# Patient Record
Sex: FEMALE | Race: WHITE | NOT HISPANIC OR LATINO | Employment: OTHER | URBAN - METROPOLITAN AREA
[De-identification: names, ages, dates, MRNs, and addresses within clinical notes are randomized per-mention and may not be internally consistent; named-entity substitution may affect disease eponyms.]

---

## 2023-07-06 LAB — THYROTROPIN (MIU/L) IN SER/PLAS BY DETECTION LIMIT <= 0.05 MIU/L: 0.14 MIU/L (ref 0.44–3.98)

## 2023-08-24 LAB — THYROTROPIN (MIU/L) IN SER/PLAS BY DETECTION LIMIT <= 0.05 MIU/L: 0.06 MIU/L (ref 0.44–3.98)

## 2023-10-31 ENCOUNTER — LAB (OUTPATIENT)
Dept: LAB | Facility: LAB | Age: 83
End: 2023-10-31
Payer: MEDICARE

## 2023-10-31 DIAGNOSIS — E03.9 HYPOTHYROIDISM, UNSPECIFIED: Primary | ICD-10-CM

## 2023-10-31 LAB — TSH SERPL-ACNC: 0.07 MIU/L (ref 0.44–3.98)

## 2023-10-31 PROCEDURE — 36415 COLL VENOUS BLD VENIPUNCTURE: CPT

## 2023-10-31 PROCEDURE — 84443 ASSAY THYROID STIM HORMONE: CPT

## 2023-11-29 DIAGNOSIS — E05.90 HYPERTHYROIDISM: ICD-10-CM

## 2023-11-29 RX ORDER — LEVOTHYROXINE SODIUM 75 UG/1
75 TABLET ORAL
Qty: 90 TABLET | Refills: 3 | Status: SHIPPED | OUTPATIENT
Start: 2023-11-29

## 2023-11-29 RX ORDER — LEVOTHYROXINE SODIUM 75 UG/1
75 TABLET ORAL
COMMUNITY
End: 2023-11-29 | Stop reason: SDUPTHER

## 2024-01-05 ENCOUNTER — APPOINTMENT (OUTPATIENT)
Dept: ORTHOPEDIC SURGERY | Facility: CLINIC | Age: 84
End: 2024-01-05
Payer: MEDICARE

## 2024-01-16 ENCOUNTER — OFFICE VISIT (OUTPATIENT)
Dept: ORTHOPEDIC SURGERY | Facility: CLINIC | Age: 84
End: 2024-01-16
Payer: MEDICARE

## 2024-01-16 ENCOUNTER — ANCILLARY PROCEDURE (OUTPATIENT)
Dept: RADIOLOGY | Facility: CLINIC | Age: 84
End: 2024-01-16
Payer: MEDICARE

## 2024-01-16 VITALS — WEIGHT: 148 LBS | HEIGHT: 62 IN | BODY MASS INDEX: 27.23 KG/M2

## 2024-01-16 DIAGNOSIS — M54.50 NONSPECIFIC PAIN IN THE LUMBAR REGION: ICD-10-CM

## 2024-01-16 DIAGNOSIS — S39.012A LUMBAR STRAIN, INITIAL ENCOUNTER: ICD-10-CM

## 2024-01-16 DIAGNOSIS — M54.16 LUMBAR RADICULOPATHY: Primary | ICD-10-CM

## 2024-01-16 DIAGNOSIS — M47.816 LUMBAR SPONDYLOSIS: ICD-10-CM

## 2024-01-16 PROCEDURE — 99214 OFFICE O/P EST MOD 30 MIN: CPT | Performed by: ORTHOPAEDIC SURGERY

## 2024-01-16 PROCEDURE — 99214 OFFICE O/P EST MOD 30 MIN: CPT | Mod: 25 | Performed by: ORTHOPAEDIC SURGERY

## 2024-01-16 PROCEDURE — 72110 X-RAY EXAM L-2 SPINE 4/>VWS: CPT | Performed by: STUDENT IN AN ORGANIZED HEALTH CARE EDUCATION/TRAINING PROGRAM

## 2024-01-16 PROCEDURE — 1159F MED LIST DOCD IN RCRD: CPT | Performed by: ORTHOPAEDIC SURGERY

## 2024-01-16 PROCEDURE — 72120 X-RAY BEND ONLY L-S SPINE: CPT

## 2024-01-16 RX ORDER — PREDNISONE 10 MG/1
TABLET ORAL
Qty: 43 TABLET | Refills: 0 | Status: SHIPPED | OUTPATIENT
Start: 2024-01-16 | End: 2024-01-25

## 2024-01-16 NOTE — PROGRESS NOTES
HPI:Rochelle Hamlin is an 83-year-old woman, who comes in with a 3-week history of acute low back pain.  When it started, she went to the [facility].  She was told to do some exercises, and sent home.  She has had some improvement although there is intermittent pain into the left buttock and leg.  She has not had a steroid taper and physical therapy.  She comes in today for second opinion.      ROS:  Reviewed on EMR and patient intake sheet.    PMH/SH:  Reviewed on EMR and patient intake sheet.    Exam:  Physical Exam    Constitutional: Well appearing; no acute distress  Eyes: pupils are equal and round  Psych: normal affect  Respiratory: non-labored breathing  Cardiovascular: regular rate and rhythm  GI: non-distended abdomen  Musculoskeletal: no pain with range of motion of the hips bilaterally  Neurologic: [4]/5 strength in the lower extremities bilaterally]; [negative] straight leg raise; no clonus; negative babinski    Radiology:     X-rays demonstrate multilevel disc degeneration.  Small lumbar curve.  Multilevel spondylosis.    Diagnosis:    Acute lumbar strain; lumbar spondylosis; lumbar radiculopathy    Assessment and Plan:   83-year-old woman, with acute lumbar strain and resulting radicular symptoms.  I recommended prednisone taper and physical therapy.  If the symptoms do not continue to improve, we will see her back as needed.    The patient was in agreement with the plan. At the end of the visit today, the patient felt that all questions had been answered satisfactorily.  The patient was pleased with the visit and very appreciative for the care rendered.     Thank you very much for the kind referral.  It is a privilege, and a pleasure, to partner with you in the care of your patients.  I would be delighted to assist you with any further consultations as needed.          Tyson Begum MD    Chief of Spine Surgery, [facility]  Director of Spine Service,  [facility]  , Department of Orthopaedics  [facility] [employer]  [address]  [place] [id number]  P: [phone]    This note was dictated with voice recognition software.  It has not been proofread for grammatical errors, typographical mistakes or other semantic inconsistencies.

## 2024-01-25 PROBLEM — E78.2 MIXED HYPERLIPIDEMIA: Status: ACTIVE | Noted: 2023-06-22

## 2024-01-25 PROBLEM — K21.9 GERD (GASTROESOPHAGEAL REFLUX DISEASE): Status: ACTIVE | Noted: 2019-05-03

## 2024-01-25 PROBLEM — E06.3 HASHIMOTO'S THYROIDITIS: Status: ACTIVE | Noted: 2023-10-31

## 2024-01-25 PROBLEM — I10 PRIMARY HYPERTENSION: Status: ACTIVE | Noted: 2023-06-22

## 2024-01-25 PROBLEM — E88.810 DYSMETABOLIC SYNDROME X: Status: ACTIVE | Noted: 2024-01-25

## 2024-01-25 RX ORDER — VIT C/E/ZN/COPPR/LUTEIN/ZEAXAN 250MG-90MG
1000 CAPSULE ORAL
COMMUNITY

## 2024-01-25 RX ORDER — EZETIMIBE 10 MG/1
10 TABLET ORAL
COMMUNITY
Start: 2017-04-04

## 2024-01-25 RX ORDER — LISINOPRIL 10 MG/1
TABLET ORAL
COMMUNITY
Start: 2023-07-10

## 2024-01-25 RX ORDER — ESOMEPRAZOLE MAGNESIUM 40 MG/1
1 CAPSULE, DELAYED RELEASE ORAL
COMMUNITY
Start: 2020-06-18

## 2024-01-25 RX ORDER — DULOXETIN HYDROCHLORIDE 20 MG/1
20 CAPSULE, DELAYED RELEASE ORAL
COMMUNITY
Start: 2019-06-04

## 2024-01-25 RX ORDER — CYANOCOBALAMIN (VITAMIN B-12) 2500 MCG
1 TABLET, SUBLINGUAL SUBLINGUAL
COMMUNITY

## 2024-01-25 RX ORDER — GABAPENTIN 100 MG/1
100 CAPSULE ORAL 2 TIMES DAILY
COMMUNITY
Start: 2019-08-01

## 2024-01-25 RX ORDER — AMLODIPINE BESYLATE 5 MG/1
5 TABLET ORAL DAILY
COMMUNITY

## 2024-02-06 ENCOUNTER — TRANSCRIBE ORDERS (OUTPATIENT)
Dept: ORTHOPEDIC SURGERY | Facility: HOSPITAL | Age: 84
End: 2024-02-06
Payer: MEDICARE

## 2024-02-06 DIAGNOSIS — S39.012A LUMBAR STRAIN, INITIAL ENCOUNTER: ICD-10-CM

## 2024-02-06 DIAGNOSIS — M54.16 LUMBAR RADICULOPATHY: ICD-10-CM

## 2024-02-06 DIAGNOSIS — M54.50 NONSPECIFIC PAIN IN THE LUMBAR REGION: ICD-10-CM

## 2024-02-08 ENCOUNTER — LAB (OUTPATIENT)
Dept: LAB | Facility: LAB | Age: 84
End: 2024-02-08
Payer: MEDICARE

## 2024-02-08 DIAGNOSIS — E05.90 HYPERTHYROIDISM: ICD-10-CM

## 2024-02-08 LAB — TSH SERPL-ACNC: 1.68 MIU/L (ref 0.44–3.98)

## 2024-02-08 PROCEDURE — 84443 ASSAY THYROID STIM HORMONE: CPT

## 2024-02-08 PROCEDURE — 36415 COLL VENOUS BLD VENIPUNCTURE: CPT

## 2024-02-15 ENCOUNTER — OFFICE VISIT (OUTPATIENT)
Dept: PAIN MEDICINE | Facility: HOSPITAL | Age: 84
End: 2024-02-15
Payer: MEDICARE

## 2024-02-15 VITALS — DIASTOLIC BLOOD PRESSURE: 83 MMHG | SYSTOLIC BLOOD PRESSURE: 163 MMHG | HEART RATE: 91 BPM

## 2024-02-15 DIAGNOSIS — M54.16 LUMBAR RADICULOPATHY: ICD-10-CM

## 2024-02-15 PROCEDURE — 1159F MED LIST DOCD IN RCRD: CPT | Performed by: ANESTHESIOLOGY

## 2024-02-15 PROCEDURE — 1125F AMNT PAIN NOTED PAIN PRSNT: CPT | Performed by: ANESTHESIOLOGY

## 2024-02-15 PROCEDURE — 3077F SYST BP >= 140 MM HG: CPT | Performed by: ANESTHESIOLOGY

## 2024-02-15 PROCEDURE — 3079F DIAST BP 80-89 MM HG: CPT | Performed by: ANESTHESIOLOGY

## 2024-02-15 PROCEDURE — 99204 OFFICE O/P NEW MOD 45 MIN: CPT | Performed by: ANESTHESIOLOGY

## 2024-02-15 PROCEDURE — 99214 OFFICE O/P EST MOD 30 MIN: CPT | Performed by: ANESTHESIOLOGY

## 2024-02-15 ASSESSMENT — PAIN - FUNCTIONAL ASSESSMENT: PAIN_FUNCTIONAL_ASSESSMENT: 0-10

## 2024-02-15 ASSESSMENT — PAIN SCALES - GENERAL: PAINLEVEL_OUTOF10: 6

## 2024-02-15 NOTE — PROGRESS NOTES
Chief Complaint   Patient presents with    Back Pain     82 y/o female c/o low back pain      HPI   Rochelle Hamlin is an 83-year-old female with a history of back pain.  She also has a history of neck pain and prior surgery with Dr. Begum.  She was referred to the office by Dr. Begum for conservative measures.  The patient does have a history of back pain years but notes that it has been particularly worse recently.  She has seen Dr. Boris Sarmiento in the past and had injections remotely which she says alleviated her right sided low back, buttock pain, sciatic symptoms.  She has similar symptoms at this time which are more so on the left.  The pain can start at the waistline, radiate into the left buttock and then she also has symptoms that can go down the leg anteriorly to the knee.  She denies any symptoms below the knee.  She says that at times her legs feel like she has worked out significantly even if she has not done anything.  Pain can be worse in the morning worse after she has been an active.  She has done prior formal physical therapy and is working out at the gym 2-3 times a week for the past 2 weeks.  She is working on weights, core strengthening, and aerobic exercise.    She has tried gabapentin, duloxetine, anti-inflammatories, Tylenol.    ROS: 13 point review of systems is complete and is negative listed above in HPI    Past Medical History:   Diagnosis Date    Vitamin D deficiency, unspecified 10/14/2014    Vitamin D deficiency     Social History     Socioeconomic History    Marital status:      Spouse name: Not on file    Number of children: Not on file    Years of education: Not on file    Highest education level: Not on file   Occupational History    Not on file   Tobacco Use    Smoking status: Not on file    Smokeless tobacco: Not on file   Substance and Sexual Activity    Alcohol use: Not on file    Drug use: Not on file    Sexual activity: Not on file   Other Topics Concern     Not on file   Social History Narrative    Not on file     Social Determinants of Health     Financial Resource Strain: Not on file   Food Insecurity: Not on file   Transportation Needs: Not on file   Physical Activity: Not on file   Stress: Not on file   Social Connections: Not on file   Intimate Partner Violence: Not on file   Housing Stability: Not on file      Family history: Noncontributory to the aforementioned chief complaint    Surgical history: Prior spine surgery, cervical    Imaging:  Patient had x-rays at last month which showed degenerative changes throughout the lumbar spine with scoliosis.  Worst disc space narrowing at L3-4.    Physical Exam:  Gen.: Patient appears to be stated age, fair hygiene  Eyes: Pupils are symmetric, conjunctiva is nonicteric and lids without obvious drooping or rash  ENT: Hearing is grossly intact, external ears and nose appear to be without deformity or rash. No lesions or masses noted.  Neck: No JVD noted, tracheal position is midline  Respiratory: No gasping or shortness of breath noted, no use of accessory muscles noted  Cardiovascular: Extremity show no edema or varicosities  Lymph: No lymphadenopathy noted in the anterior cervical regions bilaterally  Skin no rashes or open lesions or ulcers identified on the skin  Musculoskeletal: Gait is grossly normal, nontender to palpation over the facets, SI joint, trochanteric bursa.  Neurologic: Cranial nerves II through XII are grossly intact  Psychiatric:  Patient is alert and oriented x3    Impression/Plan:  83-year-old female with a history of back pain and radicular symptoms into the anterior legs at times.  Noted claudication symptoms.  She has tried some prior formal physical therapy, medication management, and had remote injections with Dr. Boris Sarmiento which relieved her symptoms which were previously on the contralateral side.    -Will plan for L3-4 intralaminar.  Procedure, risk, benefits, alternatives reviewed  with the patient.    -Encouraged to keep up with physical therapy and core strengthening.  Patient has another course of formal physical therapy scheduled in March which was the earliest available.  She is going to the gym 2-3 times a week.  I gave her further printed out physician directed exercises to do.    -If no relief with the aforementioned would plan to do an MRI.  Patient says she is extremely claustrophobic and in the past before her surgery she got an open MRI and also needed Valium to get through that.  Will hold off for now.    Patient's blood pressure has been elevated recently and she has been in contact with her primary care physician.  She had some blurred vision and headache which she is not having at this moment but she did take an additional dose of amlodipine.  Will check blood pressure before she leaves today.  She has follow-up with her PCP tomorrow.

## 2024-02-20 ENCOUNTER — APPOINTMENT (OUTPATIENT)
Dept: ORTHOPEDIC SURGERY | Facility: CLINIC | Age: 84
End: 2024-02-20
Payer: MEDICARE

## 2024-02-22 ENCOUNTER — OFFICE VISIT (OUTPATIENT)
Dept: ENDOCRINOLOGY | Facility: CLINIC | Age: 84
End: 2024-02-22
Payer: MEDICARE

## 2024-02-22 VITALS — SYSTOLIC BLOOD PRESSURE: 126 MMHG | DIASTOLIC BLOOD PRESSURE: 74 MMHG | BODY MASS INDEX: 27.98 KG/M2 | WEIGHT: 153 LBS

## 2024-02-22 DIAGNOSIS — E88.810 DYSMETABOLIC SYNDROME X: ICD-10-CM

## 2024-02-22 DIAGNOSIS — E06.3 HASHIMOTO'S THYROIDITIS: Primary | ICD-10-CM

## 2024-02-22 DIAGNOSIS — E78.2 MIXED HYPERLIPIDEMIA: ICD-10-CM

## 2024-02-22 DIAGNOSIS — I10 PRIMARY HYPERTENSION: ICD-10-CM

## 2024-02-22 PROCEDURE — 3078F DIAST BP <80 MM HG: CPT | Performed by: INTERNAL MEDICINE

## 2024-02-22 PROCEDURE — 1159F MED LIST DOCD IN RCRD: CPT | Performed by: INTERNAL MEDICINE

## 2024-02-22 PROCEDURE — 99214 OFFICE O/P EST MOD 30 MIN: CPT | Performed by: INTERNAL MEDICINE

## 2024-02-22 PROCEDURE — 1125F AMNT PAIN NOTED PAIN PRSNT: CPT | Performed by: INTERNAL MEDICINE

## 2024-02-22 PROCEDURE — 3074F SYST BP LT 130 MM HG: CPT | Performed by: INTERNAL MEDICINE

## 2024-02-22 NOTE — PROGRESS NOTES
Patient ID: Rochelle Hamlin is a 83 y.o. female who presents for Follow-up.  HPI  The patient comes in for follow up.    She has hypothyroidism hyperlipidemia insulin resistance and vitamin D deficiency.    She continues on levothyroxine 75 mcg daily.    She has had issues with labile blood pressure and is now on amlodipine.    She notes myalgias in her lower extremities and brittle nails.    She has been intolerant of simvastatin Lescol Lipitor Crestor and Livalo.    Physically she has no other complaints.    ROS  Comprehensive review of systems is negative.    Objective   Physical Exam  Visit Vitals  /74      Vitals:    02/22/24 1253   Weight: 69.4 kg (153 lb)      Body mass index is 27.98 kg/m².      Weight 153 up 4 pounds still down 13    Eyes normal  ENT normal. No adenopathy  Thyroid palpable and normal. No nodules  Chest clear to auscultation  Heart sounds are normal  Abdomen nontender. Bowel sounds normal. No organomegaly  Feet are okay    Current Outpatient Medications   Medication Sig Dispense Refill    DULoxetine (Cymbalta) 20 mg DR capsule Take 1 capsule (20 mg) by mouth once daily.      esomeprazole (NexIUM) 40 mg DR capsule Take 1 capsule (40 mg) by mouth.      ezetimibe (Zetia) 10 mg tablet Take 1 tablet (10 mg) by mouth once daily.      gabapentin (Neurontin) 100 mg capsule Take 1 capsule (100 mg) by mouth twice a day.      lisinopril 10 mg tablet Take by mouth.      amLODIPine (Norvasc) 5 mg tablet Take 1 tablet (5 mg) by mouth once daily.      biotin 5,000 mcg tablet, sublingual Place 1 tablet under the tongue once daily.      cholecalciferol (Vitamin D-3) 25 MCG (1000 UT) capsule Take 1 capsule (25 mcg) by mouth.      levothyroxine (Synthroid, Levoxyl) 75 mcg tablet Take 1 tablet (75 mcg) by mouth once daily in the morning. Take before meals. 90 tablet 3     No current facility-administered medications for this visit.       Assessment/Plan     1.  Hypothyroidism  2.  Insulin  resistance  3.  Hyperlipidemia    We reviewed her blood work from 2 weeks ago.    We discussed her thyroid levels being appropriate.    She will continue her current regimen.    We again discussed the nonspecificity with regards to thyroid symptoms.    Advise she follow-up with her primary care doctor regarding the lability of her blood pressure.    She will follow-up with me in 6 months sooner as needed.

## 2024-03-04 ENCOUNTER — APPOINTMENT (OUTPATIENT)
Dept: RADIOLOGY | Facility: HOSPITAL | Age: 84
End: 2024-03-04
Payer: MEDICARE

## 2024-03-04 NOTE — PROGRESS NOTES
Physical Therapy  Physical Therapy Orthopedic Evaluation    Patient Name: Rochelle Hamlin  MRN: [id number]  Today's Date: 3/5/2024  Time Calculation  Start Time: 0945  Stop Time: 1025  Time Calculation (min): 40 min    PT Evaluation Time Entry  PT Evaluation (Low) Time Entry: 15  PT Therapeutic Procedures Time Entry  Manual Therapy Time Entry: 13  Therapeutic Exercise Time Entry: 10       Insurance:  Visit number: 1 of [place]  Authorization info: No auth  Insurance Type: Payor: [employer] MEDICARE / Plan: UNITED [employer] MEDICARE / Product Type: *No Product type* /      Current Problem  1. Lumbar radiculopathy  Referral to Physical Therapy    Follow Up In Physical Therapy          Surgery:No    Referred by: Gus Ren Provider     Precautions:   Neck Fusion-3 level, Lobectomy 2023  Medical History Form: Reviewed (scanned into chart)    Subjective:   Subjective   Chief Complaint: Low back pain  Onset: 2 months ago  STEPHANIE: Acute on chronic flare up    General: 2 month history of acute low back pain. When it started, she went to the [facility]. She was told to do some exercises, and sent home. She has had some improvement although there is intermittent pain into the left buttock and leg. She has not had a steroid taper and physical therapy. Still having L side radic from her back to the knee on the outside. Sleeping with a pillow between her legs     Current Condition:   Better    Pain:     Location: L side radic> back pain  Rating: Best-1, Current-3, Worst-7  Description: L Radic is achy  Aggravating Factors:  sleeping   Relieving Factors:  tylenol, and PT stretches from the past    Relevant Information (PMH & Previous Tests/Imaging): X-rays demonstrate multilevel disc degeneration. Small lumbar curve. Multilevel spondylosis.     Previous Interventions/Treatments: None    Prior Level of Function (PLOF)  Patient previously independent with all ADLs  Exercise/Physical Activity: Golf 3 x per  "week  Work/School: Retired, she does report she has been working part-time at a [employer] facility on Saturdays    Patients Living Environment: Reviewed and no concern  Primary Language: English  Patient's Goal(s) for Therapy: Pain relief     Red Flags: Do you have any of the following? No  Fever/chills, unexplained weight changes, dizziness/fainting, unexplained change in bowel or bladder functions, unexplained malaise or muscle weakness, night pain/sweats, numbness or tingling    Objective:  Objective     Palpation/Observation  TTP L L4/L5  Posture  No accentuated curvatures, Neutral LE alignment   No leg length, pelvic landmarks are symmetrical   Special Testing  + SLR, + sacral rock  ROM  3/5/2024  Trunk  Flexion within normal limits  Extension 50% limited  Right left rotation mary alice 25% limited with mild pain  Hip flexion bilaterally within normal limits, hip external rotation bilaterally within normal limits, internal rotation limited by 10 to 15 degrees  Hamstring flexibility 45 degrees popliteal angle bilaterally  Central PA glides of the lumbar spine do show mild restriction at L4 and L5.  Strength  3/5/2024  4/5 hip flexion on the L, all other areas WNL  Sensation  L4 and L5 L paraesthesias to the lateral knee  Reflexes  2+ seated achilles and patellar    Transfers: Not using mary upper extremities for sit to stand, does requires Min A for supine to sit  Gait: WNL not using assistive device   SL Balance: NT  DL Squat: sit to stand using mary upper extremity  SL Squat: NT     Outcome Measures:  Other Measures  Oswestry Disablity Index (WILLIE): 16%   3/5/2024  Treatments:  Ther-EX:  SKTC 2 x 30\"  Hamstring stretch 2 x 30\"  Pelvic tilt x 10  Bridging x 10    There- ACT:  NP  Neuro:  NP  Manual:  L STM in the R sidelying position lumbar paraspinals L4-s1  Modalities:  NP    PT Evaluation Time Entry  PT Evaluation (Low) Time Entry: 15  PT Therapeutic Procedures Time Entry  Manual Therapy Time Entry: 13  Therapeutic " Exercise Time Entry: 10       EDUCATION: Home exercise program, plan of care, activity modifications, pain management, and injury pathology         Assessment: Patient presents with signs and symptoms consistent with lumbar DDD, resulting in limited participation in pain-free ADLs and inability to perform at their prior level of function. Pt would benefit from physical therapy to address the impairments found & listed previously in the objective section in order to return to safe and pain-free ADLs and prior level of function.     Complexity:Low  Prognosis: Good  Response to care: Good    Problem List:  Pain  Function  Mobility  Strength  Plan:     Planned Interventions include: therapeutic exercise, self-care home management, manual therapy, therapeutic activities, gait training, neuromuscular coordination, vasopneumatic, dry needling, aquatic therapy    Next Treatment:Bike, hip flexor stretch, supine march  Frequency: 1 x Week  Duration: 6 Weeks  Goals: Set and discussed today  Active       PT Problem       PT Goal 1       Start:  03/05/24    Expected End:  04/16/24       1.Patient to be independent with HEP for progressions and carryover    2. Patient to report pain less than 3/10 for return to daily activities with less radicular pain    3. Patient to have improved ability to transfer with out assistance to assist with returning to daily activities independently    4. Improved Oswestry disability outcome measure for improved pain and function with daily activities    5.  Patient able to return to golf without limitations from low back                Plan of care was developed with input and agreement by the patient      Lg Ying, PT

## 2024-03-05 ENCOUNTER — EVALUATION (OUTPATIENT)
Dept: PHYSICAL THERAPY | Facility: CLINIC | Age: 84
End: 2024-03-05
Payer: MEDICARE

## 2024-03-05 DIAGNOSIS — M54.16 LUMBAR RADICULOPATHY: ICD-10-CM

## 2024-03-05 PROCEDURE — 97110 THERAPEUTIC EXERCISES: CPT | Mod: GP | Performed by: PHYSICAL THERAPIST

## 2024-03-05 PROCEDURE — 97140 MANUAL THERAPY 1/> REGIONS: CPT | Mod: GP | Performed by: PHYSICAL THERAPIST

## 2024-03-05 PROCEDURE — 97161 PT EVAL LOW COMPLEX 20 MIN: CPT | Mod: GP | Performed by: PHYSICAL THERAPIST

## 2024-03-05 ASSESSMENT — ENCOUNTER SYMPTOMS
OCCASIONAL FEELINGS OF UNSTEADINESS: 0
DEPRESSION: 0
LOSS OF SENSATION IN FEET: 0

## 2024-03-11 ENCOUNTER — HOSPITAL ENCOUNTER (OUTPATIENT)
Dept: RADIOLOGY | Facility: HOSPITAL | Age: 84
Discharge: HOME | End: 2024-03-11
Payer: MEDICARE

## 2024-03-11 VITALS
OXYGEN SATURATION: 98 % | DIASTOLIC BLOOD PRESSURE: 54 MMHG | HEART RATE: 73 BPM | SYSTOLIC BLOOD PRESSURE: 121 MMHG | RESPIRATION RATE: 16 BRPM | TEMPERATURE: 97.7 F

## 2024-03-11 DIAGNOSIS — M54.16 LUMBAR RADICULOPATHY: ICD-10-CM

## 2024-03-11 PROCEDURE — 62323 NJX INTERLAMINAR LMBR/SAC: CPT | Performed by: ANESTHESIOLOGY

## 2024-03-11 ASSESSMENT — PAIN SCALES - GENERAL
PAINLEVEL_OUTOF10: 3
PAINLEVEL_OUTOF10: 0 - NO PAIN

## 2024-03-11 ASSESSMENT — PAIN - FUNCTIONAL ASSESSMENT
PAIN_FUNCTIONAL_ASSESSMENT: 0-10
PAIN_FUNCTIONAL_ASSESSMENT: 0-10

## 2024-03-11 NOTE — PROCEDURES
Preoperative diagnosis:  Lumbar radiculitis  Postoperative diagnosis:  Lumbar radiculitis, stenosis  Procedure: L3-4 lumbar epidural steroid injection under fluoroscopic guidance  Surgeon: Brandie Rucker  Assistant:  Fellow, Gabe Kenyon  Anesthesia: Local  Complications: Apparently none    Clinical note: Ms. Rochelle Hamlin is an 83-year-old female with history of back and symptoms more to the left side.  She presents today for an epidural injection.    Procedure note: The patient was met in the preoperative holding area after risks benefits and alternatives to procedure were discussed with the patient, informed consent was obtained. Patient brought back to the procedure room and placed in the prone position on the fluoroscopy table. Area over the back was exposed, prepped, draped, in the usual sterile fashion.  Skin and subcutaneous tissues to the lumbar intralaminar space was anesthetized using 0.5% lidocaine, entry from a left paraspinous approach.  An 18-gauge Tuohy needle was inserted in the skin and advanced into the interlaminar space however there is a good amount of bony os specially on the left of midline. A glass syringe was used to achieve the epidural space using the loss resistance technique. Contrast was injected which showed appropriate epidural spread, no intravascular or intrathecal uptake.  Contrast confirmed in AP and lateral views.  A total of 3 mL's of 0.5% lidocaine mixed with 40 mg methylprednisolone was injected. Needle removed, bandage applied, patient tolerated the procedure well with no immediate complications.

## 2024-03-11 NOTE — H&P
History Of Present Illness  Rochelle Hamlin is a 83 y.o. female presents for procedure state below. Endorses no changes in past medical history or medical health since last seen in clinic.     Past Medical History  She has a past medical history of Vitamin D deficiency, unspecified (10/14/2014).    Surgical History  She has no past surgical history on file.     Social History  She has no history on file for tobacco use, alcohol use, and drug use.    Family History  No family history on file.     Allergies  Codeine and Penicillins    Review of Symptoms:   Constitutional: Negative for chills, diaphoresis or fever  HENT: Negative for neck swelling  Eyes:.  Negative for eye pain  Respiratory:.  Negative for cough, shortness of breath or wheezing    Cardiovascular:.  Negative for chest pain or palpitations  Gastrointestinal:.  Negative for abdominal pain, nausea and vomiting  Genitourinary:.  Negative for urgency  Musculoskeletal:  Positive for back pain. Positive for joint pain. Denies falls within the past 3 months.  Skin: Negative for wounds or itching   Neurological: Negative for dizziness, seizures, loss of consciousness and weakness  Endo/Heme/Allergies: Does not bruise/bleed easily  Psychiatric/Behavioral: Negative for depression. The patient does not appear anxious.       PHYSICAL EXAM  Vitals signs reviewed  Constitutional:       General: Not in acute distress     Appearance: Normal appearance. Not ill-appearing.  HENT:     Head: Normocephalic and atraumatic  Eyes:     Conjunctiva/sclera: Conjunctivae normal  Cardiovascular:     Rate and Rhythm: Normal rate and regular rhythm  Pulmonary:     Effort: No respiratory distress  Abdominal:     Palpations: Abdomen is soft  Musculoskeletal: WADE  Skin:     General: Skin is warm and dry  Neurological:     General: No focal deficit present  Psychiatric:         Mood and Affect: Mood normal         Behavior: Behavior normal     Last Recorded Vitals  /66   Pulse  81   Temp 36.6 °C (97.8 °F) (Temporal)   Resp 16   SpO2 96%     Relevant Results  Current Outpatient Medications   Medication Instructions    amLODIPine (NORVASC) 5 mg, oral, Daily    biotin 5,000 mcg tablet, sublingual 1 tablet, sublingual, Daily RT    cholecalciferol (VITAMIN D-3) 1,000 Units, oral    DULoxetine (CYMBALTA) 20 mg, oral, Daily RT    esomeprazole (NexIUM) 40 mg DR capsule 1 capsule, oral    ezetimibe (ZETIA) 10 mg, oral, Daily RT    gabapentin (NEURONTIN) 100 mg, oral, 2 times daily    levothyroxine (SYNTHROID, LEVOXYL) 75 mcg, oral, Daily before breakfast    lisinopril 10 mg tablet oral       No results found for this or any previous visit from the past 1000 days.     No image results found.       1. Lumbar radiculopathy  FL pain management TC    FL pain management TC    Epidural Steroid Injection    Epidural Steroid Injection           ASSESSMENT/PLAN  Rochelle Hamlin is a 83 y.o. female presents for L3-4 intralaminar     Our plan is as follows:  - Follow In pain clinic  - Continue to participate in physical therapy as well as physician directed home exercises  - Continue pain medications as prescribed       Gabe Kenyon MD

## 2024-03-21 ENCOUNTER — TREATMENT (OUTPATIENT)
Dept: PHYSICAL THERAPY | Facility: CLINIC | Age: 84
End: 2024-03-21
Payer: MEDICARE

## 2024-03-21 DIAGNOSIS — M54.16 LUMBAR RADICULOPATHY: ICD-10-CM

## 2024-03-21 PROCEDURE — 97140 MANUAL THERAPY 1/> REGIONS: CPT | Mod: GP | Performed by: PHYSICAL THERAPIST

## 2024-03-21 NOTE — PROGRESS NOTES
Physical Therapy  Physical Therapy Treatment Note    Patient Name: Rochelle Hamlin  MRN: [id number]  Today's Date: 3/21/2024  Time Calculation  Start Time: 0903  Stop Time: 0941  Time Calculation (min): 38 min       PT Therapeutic Procedures Time Entry  Manual Therapy Time Entry: 38     Referring:Gus Ren Provider     Insurance:  Visit number: 2 of [place]    Authorization info: No auth  Insurance Type: Payor: [facility] MEDICARE / Plan: [employer] MEDICARE / Product Type: *No Product type* /     Current Problem  1. Lumbar radiculopathy  Follow Up In Physical Therapy          General   2 month history of acute low back pain. When it started, she went to the [facility]. She was told to do some exercises, and sent home. She has had some improvement although there is intermittent pain into the left buttock and leg. She has not had a steroid taper and physical therapy. Still having L side radic from her back to the knee on the outside. Sleeping with a pillow between her legs     Precautions   3/11 L3-4 lumbar epidural steroid injection under fluoroscopic guidance , : X-rays demonstrate multilevel disc degeneration. Small lumbar curve. Multilevel spondylosis.     Subjective:   Subjective   Patient reports Feels better after having [facility] last week  Pain    L side Radic to the hip, mild L>R LBP  Objective:   Objective   ROM  3/5/2024  Trunk  Flexion within normal limits  Extension 50% limited  Right left rotation mary alice 25% limited with mild pain  Hip flexion bilaterally within normal limits, hip external rotation bilaterally within normal limits, internal rotation limited by 10 to 15 degrees  Hamstring flexibility 45 degrees popliteal angle bilaterally  Central PA glides of the lumbar spine do show mild restriction at L4 and L5.  Strength  3/5/2024  4/5 hip flexion on the L, all other areas WNL    Treatments:   Ther Ex  Reviewed HEP  Initiated scap retractions for thoracic  tightness  Manual    STM, L sided lumbar parspinals, Long axis distraction mary, L>R       PT Therapeutic Procedures Time Entry  Manual Therapy Time Entry: 38     HEP Access Code:  Assessment:  Responded well to manual therapy without increased pain, She is active at home and consistent with HEP, about 50% improvement since having epidural     Plan: Continue plan of care, initiate clamshells next f/u     Goals:  Active       PT Problem       PT Goal 1       Start:  03/05/24    Expected End:  04/16/24       1.Patient to be independent with HEP for progressions and carryover    2. Patient to report pain less than 3/10 for return to daily activities with less radicular pain    3. Patient to have improved ability to transfer with out assistance to assist with returning to daily activities independently    4. Improved Oswestry disability outcome measure for improved pain and function with daily activities    5.  Patient able to return to golf without limitations from low back                 Lg Ying, PT

## 2024-04-01 ENCOUNTER — TREATMENT (OUTPATIENT)
Dept: PHYSICAL THERAPY | Facility: CLINIC | Age: 84
End: 2024-04-01
Payer: MEDICARE

## 2024-04-01 DIAGNOSIS — M54.16 LUMBAR RADICULOPATHY: Primary | ICD-10-CM

## 2024-04-01 PROCEDURE — 97140 MANUAL THERAPY 1/> REGIONS: CPT | Mod: GP | Performed by: PHYSICAL THERAPIST

## 2024-04-01 PROCEDURE — 97110 THERAPEUTIC EXERCISES: CPT | Mod: GP | Performed by: PHYSICAL THERAPIST

## 2024-04-01 NOTE — PROGRESS NOTES
Physical Therapy  Physical Therapy Treatment Note    Patient Name: Rochelle Hamlin  MRN: [id number]  Today's Date: 4/1/2024  Time Calculation  Start Time: 0345  Stop Time: 0430  Time Calculation (min): 45 min       PT Therapeutic Procedures Time Entry  Manual Therapy Time Entry: 25  Therapeutic Exercise Time Entry: 15     Referring:Gus Ren Provider     Insurance:  Visit [place]: [place] of [place]    Authorization info: No auth  Insurance Type: Payor: [facility] MEDICARE / Plan: [employer] MEDICARE / Product Type: *No Product type* /     Current Problem  1. Lumbar radiculopathy              General   2 month history of acute low back pain. When it started, she went to the [facility]. She was told to do some exercises, and sent home. She has had some improvement although there is intermittent pain into the left buttock and leg. She has not had a steroid taper and physical therapy. Still having L side radic from her back to the knee on the outside. Sleeping with a pillow between her legs     Precautions   3/11 L3-4 lumbar epidural steroid injection under fluoroscopic guidance , : X-rays demonstrate multilevel disc degeneration. Small lumbar curve. Multilevel spondylosis.     Subjective:   Subjective   Overall feels better, not in as much pain or discomfort, she is pleased with progress feels about 50% better  Pain    L side Radic to the hip, mild L>R LBP  Objective:   Objective   ROM  3/5/2024  Trunk  Flexion within normal limits  Extension 50% limited  Right left rotation mary alice 25% limited with mild pain  Hip flexion bilaterally within normal limits, hip external rotation bilaterally within normal limits, internal rotation limited by 10 to 15 degrees  Hamstring flexibility 45 degrees popliteal angle bilaterally  Central PA glides of the lumbar spine do show mild restriction at L4 and L5.  Strength  3/5/2024  4/5 hip flexion on the L, all other areas WNL    Treatments:   Ther Ex  Reviewed  HEP  Initiated scap retractions for thoracic tightness  Bike x 5  Trunk rotations x 20  Swiss ball roll x20  Manual    STM, L sided lumbar parspinals, Long axis distraction mary, L>R       PT Therapeutic Procedures Time Entry  Manual Therapy Time Entry: 25  Therapeutic Exercise Time Entry: 15     HEP Access Code:  Assessment:  Tolerated treatment without pain or discomfort, her pain is better, her function is better as she is able to swing golf clubs and take care of her  with less pain     Plan: Continue plan of care, initiate clamshells next f/u     Goals:  Active       PT Problem       PT Goal 1       Start:  03/05/24    Expected End:  04/16/24       1.Patient to be independent with HEP for progressions and carryover    2. Patient to report pain less than 3/10 for return to daily activities with less radicular pain    3. Patient to have improved ability to transfer with out assistance to assist with returning to daily activities independently    4. Improved Oswestry disability outcome measure for improved pain and function with daily activities    5.  Patient able to return to golf without limitations from low back                 Lg Ying, PT

## 2024-04-05 ENCOUNTER — LAB REQUISITION (OUTPATIENT)
Dept: LAB | Facility: HOSPITAL | Age: 84
End: 2024-04-05
Payer: MEDICARE

## 2024-04-05 DIAGNOSIS — N89.9 NONINFLAMMATORY DISORDER OF VAGINA, UNSPECIFIED: ICD-10-CM

## 2024-04-05 PROCEDURE — 87086 URINE CULTURE/COLONY COUNT: CPT

## 2024-04-07 LAB — BACTERIA UR CULT: NORMAL

## 2024-04-08 ENCOUNTER — TREATMENT (OUTPATIENT)
Dept: PHYSICAL THERAPY | Facility: CLINIC | Age: 84
End: 2024-04-08
Payer: MEDICARE

## 2024-04-08 DIAGNOSIS — M54.16 LUMBAR RADICULOPATHY: ICD-10-CM

## 2024-04-08 PROCEDURE — 97110 THERAPEUTIC EXERCISES: CPT | Mod: GP | Performed by: PHYSICAL THERAPIST

## 2024-04-08 PROCEDURE — 97140 MANUAL THERAPY 1/> REGIONS: CPT | Mod: GP | Performed by: PHYSICAL THERAPIST

## 2024-04-08 NOTE — PROGRESS NOTES
Physical Therapy  Physical Therapy Treatment Note    Patient Name: Rochelle Hamlin  MRN: [id number]  Today's Date: 4/8/2024  Time Calculation  Start Time: 0930  Stop Time: 1015  Time Calculation (min): 45 min       PT Therapeutic Procedures Time Entry  Manual Therapy Time Entry: 30  Therapeutic Exercise Time Entry: 15     Referring:Gus Ren Provider     Insurance:  Visit number: 4 of [place]    Authorization info: No auth  Insurance Type: Payor: [facility] MEDICARE / Plan: [employer] MEDICARE / Product Type: *No Product type* /     Current Problem  1. Lumbar radiculopathy  Follow Up In Physical Therapy            General   2 month history of acute low back pain. When it started, she went to the [facility]. She was told to do some exercises, and sent home. She has had some improvement although there is intermittent pain into the left buttock and leg. She has not had a steroid taper and physical therapy. Still having L side radic from her back to the knee on the outside. Sleeping with a pillow between her legs     Precautions   3/11 L3-4 lumbar epidural steroid injection under fluoroscopic guidance , : X-rays demonstrate multilevel disc degeneration. Small lumbar curve. Multilevel spondylosis.     Subjective:   Subjective   Overall feels better, not in any pain this morning  Pain    L side Radic to the hip, mild L>R LBP  Objective:   Objective   ROM  3/5/2024  Trunk  Flexion within normal limits  Extension 50% limited  Right left rotation mary alice 25% limited with mild pain  Hip flexion bilaterally within normal limits, hip external rotation bilaterally within normal limits, internal rotation limited by 10 to 15 degrees  Hamstring flexibility 45 degrees popliteal angle bilaterally  Central PA glides of the lumbar spine do show mild restriction at L4 and L5.  Strength  3/5/2024  4/5 hip flexion on the L, all other areas WNL    Treatments:   Ther Ex  Reviewed HEP  Bike x 5  Swiss ball roll  x20  Trunk rotations x 20  PPT x 15  Manual    STM, L sided lumbar parspinals, Long axis distraction mary, L>R       PT Therapeutic Procedures Time Entry  Manual Therapy Time Entry: 30  Therapeutic Exercise Time Entry: 15     HEP Access Code:  Assessment:  Less low back pain and L radic. Minor LBP from Lumbar DDD     Plan: Continue plan of care,     Goals:  Active       PT Problem       PT Goal 1 (Progressing)       Start:  03/05/24    Expected End:  04/16/24       1.Patient to be independent with HEP for progressions and carryover    2. Patient to report pain less than 3/10 for return to daily activities with less radicular pain    3. Patient to have improved ability to transfer with out assistance to assist with returning to daily activities independently    4. Improved Oswestry disability outcome measure for improved pain and function with daily activities    5.  Patient able to return to golf without limitations from low back                 Lg Ying, PT

## 2024-04-15 ENCOUNTER — TREATMENT (OUTPATIENT)
Dept: PHYSICAL THERAPY | Facility: CLINIC | Age: 84
End: 2024-04-15
Payer: MEDICARE

## 2024-04-15 DIAGNOSIS — M54.16 LUMBAR RADICULOPATHY: ICD-10-CM

## 2024-04-15 PROCEDURE — 97140 MANUAL THERAPY 1/> REGIONS: CPT | Mod: GP | Performed by: PHYSICAL THERAPIST

## 2024-04-15 NOTE — PROGRESS NOTES
Physical Therapy  Physical Therapy Treatment Note    Patient Name: Rochelle Hamlin  MRN: [id number]  Today's Date: 4/15/2024  Time Calculation  Start Time: 1113  Stop Time: 1143  Time Calculation (min): 30 min       PT Therapeutic Procedures Time Entry  Manual Therapy Time Entry: 25     Referring:Gus Ren Provider     Insurance:  Visit [place]: 5 of [place]    Authorization info: No auth  Insurance Type: Payor: [facility] MEDICARE / Plan: [employer] MEDICARE / Product Type: *No Product type* /     Current Problem  1. Lumbar radiculopathy  Follow Up In Physical Therapy            General   2 month history of acute low back pain. When it started, she went to the [facility]. She was told to do some exercises, and sent home. She has had some improvement although there is intermittent pain into the left buttock and leg. She has not had a steroid taper and physical therapy. Still having L side radic from her back to the knee on the outside. Sleeping with a pillow between her legs     Precautions   3/11 L3-4 lumbar epidural steroid injection under fluoroscopic guidance , : X-rays demonstrate multilevel disc degeneration. Small lumbar curve. Multilevel spondylosis.     Subjective:   Subjective   Back is in a good place, she is feeling better   Pain    L side Radic to the hip, mild L>R LBP  Objective:   Objective   ROM  3/5/2024  Trunk  Flexion within normal limits  Extension 50% limited  Right left rotation mary alice 25% limited with mild pain  Hip flexion bilaterally within normal limits, hip external rotation bilaterally within normal limits, internal rotation limited by 10 to 15 degrees  Hamstring flexibility 45 degrees popliteal angle bilaterally  Central PA glides of the lumbar spine do show mild restriction at L4 and L5.  Strength  3/5/2024  4/5 hip flexion on the L, all other areas WNL    Treatments:   Ther Ex  Reviewed HEP  Bike x 5    Manual    STM, L sided lumbar parspinals, Long axis  distraction mary, L>R  Time restricted by 15minutes secondary to patient having to leave early     PT Therapeutic Procedures Time Entry  Manual Therapy Time Entry: 25     HEP Access Code:  Assessment:  Less low back pain and L radic. Minor LBP from Lumbar DDD     Plan: Continue plan of care, she is going to try golfing     Goals:  Active       PT Problem       PT Goal 1 (Progressing)       Start:  03/05/24    Expected End:  04/16/24       1.Patient to be independent with HEP for progressions and carryover    2. Patient to report pain less than 3/10 for return to daily activities with less radicular pain    3. Patient to have improved ability to transfer with out assistance to assist with returning to daily activities independently    4. Improved Oswestry disability outcome measure for improved pain and function with daily activities    5.  Patient able to return to golf without limitations from low back                 Lg Ying, PT

## 2024-04-22 ENCOUNTER — APPOINTMENT (OUTPATIENT)
Dept: PHYSICAL THERAPY | Facility: CLINIC | Age: 84
End: 2024-04-22
Payer: MEDICARE

## 2024-04-29 ENCOUNTER — TREATMENT (OUTPATIENT)
Dept: PHYSICAL THERAPY | Facility: CLINIC | Age: 84
End: 2024-04-29
Payer: MEDICARE

## 2024-04-29 DIAGNOSIS — M54.16 LUMBAR RADICULOPATHY: ICD-10-CM

## 2024-04-29 PROCEDURE — 97110 THERAPEUTIC EXERCISES: CPT | Mod: GP | Performed by: PHYSICAL THERAPIST

## 2024-04-29 PROCEDURE — 97140 MANUAL THERAPY 1/> REGIONS: CPT | Mod: GP | Performed by: PHYSICAL THERAPIST

## 2024-04-29 NOTE — PROGRESS NOTES
Physical Therapy  Physical Therapy Treatment Note    Patient Name: Rochelle Hamlin  MRN: [id number]  Today's Date: 4/29/2024  Time Calculation  Start Time: 0855             Referring:Tyson Begum MD    Insurance:  Visit number: 6of [place]    Authorization info: No auth  Insurance Type: Payor: [employer] MEDICARE / Plan: UNITED HE[employer] MEDICARE / Product Type: *No Product type* /     Current Problem  1. Lumbar radiculopathy  Follow Up In Physical Therapy              General   2 month history of acute low back pain. When it started, she went to the [facility]. She was told to do some exercises, and sent home. She has had some improvement although there is intermittent pain into the left buttock and leg. She has not had a steroid taper and physical therapy. Still having L side radic from her back to the knee on the outside. Sleeping with a pillow between her legs     Precautions   3/11 L3-4 lumbar epidural steroid injection under fluoroscopic guidance , : X-rays demonstrate multilevel disc degeneration. Small lumbar curve. Multilevel spondylosis.     Subjective:   Subjective   Back is in a good place, she is feeling better   Pain    L side Radic to the hip, mild L>R LBP  Objective:   Objective   ROM  3/5/2024  Trunk  Flexion within normal limits  Extension 50% limited  Right left rotation mary alice 25% limited with mild pain  Hip flexion bilaterally within normal limits, hip external rotation bilaterally within normal limits, internal rotation limited by 10 to 15 degrees  Hamstring flexibility 45 degrees popliteal angle bilaterally  Central PA glides of the lumbar spine do show mild restriction at L4 and L5.  Strength  3/5/2024  4/5 hip flexion on the L, all other areas WNL    Treatments:   Ther Ex  Reviewed HEP  Bike x 5  Swiss ball roll x 15  Clamshells GTB x 20      Manual    STM, L sided lumbar parspinals, Long axis distraction mary, L>R           HEP Access Code:  Assessment:  Less low back pain and  L radic. Minor LBP from Lumbar DDD. L Radic continues to be problematic and is fairly constant over the last few days/week     Plan: Continue plan of care, she is going to contact physician about MRI     Goals:  Active       PT Problem       PT Goal 1 (Progressing)       Start:  03/05/24    Expected End:  05/13/24       1.Patient to be independent with HEP for progressions and carryover    2. Patient to report pain less than 3/10 for return to daily activities with less radicular pain    3. Patient to have improved ability to transfer with out assistance to assist with returning to daily activities independently    4. Improved Oswestry disability outcome measure for improved pain and function with daily activities    5.  Patient able to return to golf without limitations from low back                 Lg Ying, PT

## 2024-05-06 ENCOUNTER — TREATMENT (OUTPATIENT)
Dept: PHYSICAL THERAPY | Facility: CLINIC | Age: 84
End: 2024-05-06
Payer: MEDICARE

## 2024-05-06 DIAGNOSIS — M54.16 LUMBAR RADICULOPATHY: Primary | ICD-10-CM

## 2024-05-06 PROCEDURE — 97140 MANUAL THERAPY 1/> REGIONS: CPT | Mod: GP | Performed by: PHYSICAL THERAPIST

## 2024-05-06 PROCEDURE — 97110 THERAPEUTIC EXERCISES: CPT | Mod: GP | Performed by: PHYSICAL THERAPIST

## 2024-05-06 NOTE — PROGRESS NOTES
Physical Therapy  Physical Therapy Treatment Note    Patient Name: Rochelle Hamlin  MRN: [id number]  Today's Date: 5/6/2024  Time Calculation  Start Time: 1600  Stop Time: 1645  Time Calculation (min): 45 min       PT Therapeutic Procedures Time Entry  Manual Therapy Time Entry: 30  Therapeutic Exercise Time Entry: 10     Referring:Tyson Begum MD    Insurance:  Visit number: 7 of [place]    Authorization info: No auth  Insurance Type: Payor: [employer] MEDICARE / Plan: [employer] MEDICARE / Product Type: *No Product type* /     Current Problem  1. Lumbar radiculopathy  Follow Up In Physical Therapy            General   2 month history of acute low back pain. When it started, she went to the [facility]. She was told to do some exercises, and sent home. She has had some improvement although there is intermittent pain into the left buttock and leg. She has not had a steroid taper and physical therapy. Still having L side radic from her back to the knee on the outside. Sleeping with a pillow between her legs     Precautions   3/11 L3-4 lumbar epidural steroid injection under fluoroscopic guidance , : X-rays demonstrate multilevel disc degeneration. Small lumbar curve. Multilevel spondylosis.     Subjective:   Subjective   Fatigued from golf today, overall pleased with progress, did reach out to pain mangment for follow up   Pain    L side Radic to the hip, mild L>R LBP  Objective:   Objective   ROM  3/5/2024  Trunk  Flexion within normal limits  Extension 50% limited  Right left rotation mary alice 25% limited with mild pain  Hip flexion bilaterally within normal limits, hip external rotation bilaterally within normal limits, internal rotation limited by 10 to 15 degrees  Hamstring flexibility 45 degrees popliteal angle bilaterally  Central PA glides of the lumbar spine do show mild restriction at L4 and L5.  Strength  3/5/2024  4/5 hip flexion on the L, all other areas WNL    Treatments:   Ther  "Ex  Reviewed HEP  Bike x 5  Swiss ball roll x 15  Trunk rotations x 20  SKTC 1 x 30\"  Supine march x 20      Manual    STM, L sided lumbar parspinals, Long axis distraction mary, L>R     PT Therapeutic Procedures Time Entry  Manual Therapy Time Entry: 30  Therapeutic Exercise Time Entry: 10     HEP Access Code:  Assessment:  Low back pain is good overall, L leg is a little flared up since playing golf     Plan: Continue plan of care, she is going to contact physician about MRI     Goals:  Active       PT Problem       PT Goal 1 (Progressing)       Start:  03/05/24    Expected End:  05/13/24       1.Patient to be independent with HEP for progressions and carryover    2. Patient to report pain less than 3/10 for return to daily activities with less radicular pain    3. Patient to have improved ability to transfer with out assistance to assist with returning to daily activities independently    4. Improved Oswestry disability outcome measure for improved pain and function with daily activities    5.  Patient able to return to golf without limitations from low back                 Lg Ying, PT  "

## 2024-05-09 ENCOUNTER — OFFICE VISIT (OUTPATIENT)
Dept: OTOLARYNGOLOGY | Facility: CLINIC | Age: 84
End: 2024-05-09
Payer: MEDICARE

## 2024-05-09 VITALS — HEIGHT: 62 IN | TEMPERATURE: 97 F | WEIGHT: 151.3 LBS | BODY MASS INDEX: 27.84 KG/M2

## 2024-05-09 DIAGNOSIS — H61.21 IMPACTED CERUMEN OF RIGHT EAR: ICD-10-CM

## 2024-05-09 DIAGNOSIS — H69.93 DYSFUNCTION OF BOTH EUSTACHIAN TUBES: ICD-10-CM

## 2024-05-09 DIAGNOSIS — H93.8X2 SENSATION OF FULLNESS IN LEFT EAR: Primary | ICD-10-CM

## 2024-05-09 PROCEDURE — 1160F RVW MEDS BY RX/DR IN RCRD: CPT | Performed by: NURSE PRACTITIONER

## 2024-05-09 PROCEDURE — 1159F MED LIST DOCD IN RCRD: CPT | Performed by: NURSE PRACTITIONER

## 2024-05-09 PROCEDURE — 99213 OFFICE O/P EST LOW 20 MIN: CPT | Performed by: NURSE PRACTITIONER

## 2024-05-09 PROCEDURE — 1036F TOBACCO NON-USER: CPT | Performed by: NURSE PRACTITIONER

## 2024-05-09 RX ORDER — FLUTICASONE PROPIONATE 50 MCG
1 SPRAY, SUSPENSION (ML) NASAL 2 TIMES DAILY
Qty: 16 G | Refills: 11 | Status: SHIPPED | OUTPATIENT
Start: 2024-05-09 | End: 2025-05-09

## 2024-05-09 ASSESSMENT — PATIENT HEALTH QUESTIONNAIRE - PHQ9
1. LITTLE INTEREST OR PLEASURE IN DOING THINGS: NOT AT ALL
SUM OF ALL RESPONSES TO PHQ9 QUESTIONS 1 AND 2: 0
2. FEELING DOWN, DEPRESSED OR HOPELESS: NOT AT ALL

## 2024-05-09 NOTE — PROGRESS NOTES
Subjective   Patient ID: Rochelle Hamlin is a 84 y.o. female who presents for New Patient Visit.  HPI  This patient is referred for evaluation of a sensation of clogged  left ear.  The patient is not accompanied by anyone.   When asked about ear pain, hearing loss, itching, discharge from ear, tinnitus, aural fullness or autophony, the patient admits to left aural fullness and worsening of hearing loss.  Patient reports that she has worn hearing aids for about 3 years with good benefit.  She recently had a sinus infection and was told she had fluid behind her left eardrum.  She is also noted recent popping and crackling especially when swallowing in both ears.     When asked about a significant past otological history including history of prior ear surgery, noise exposure, exposure to ototoxic drugs or agents, and/or family history of hearing loss, the patient admits to mother and all of her siblings with hearing loss of unknown etiology.    Review of Systems  A comprehensive or 10 points review of the patient´s constitutional, neurological, HEENT, pulmonary, cardiovascular and genito-urinary systems showed only those mentioned in history of present illness.    Objective   Physical Exam  Constitutional: no fever, chills, weight loss or weight gain   General appearance: Appears well, well-nourished, well groomed. No acute distress.   Communication: Normal communication   Psychiatric: Oriented to person, place and time. Normal mood and affect.   Neurologic: Cranial nerves II-XII grossly intact and symmetric bilaterally.   Head and Face:   Head: Atraumatic with no masses, lesions or scarring.   Face: Normal symmetry, no paralysis, synkinesis or facial tic. No scars or deformities.     Eyes: Conjunctiva not edematous or erythematous   Ears: External inspection of ears with no deformity, scars or masses.  Right canal with cerumen impaction.  Left canal clear.  TM intact.  No significant effusion or retraction noted.   Good movement with auto insufflation.     Neck: Normal appearing, symmetric, trachea midline.   Cardiovascular: Examination of peripheral vascular system shows no clubbing or cyanosis.   Respiratory: No respiratory distress increased work of breathing. Inspection of the chest with symmetric chest expansion and normal respiratory effort.   Skin: No rashes in the head or neck    Assessment/Plan        This patient presents for initial evaluation of acute acquired right-sided cerumen impaction, left aural fullness, bilateral eustachian tube dysfunction.    Reassurance given that otologic exam looks good after cleaning.  I do not see any significant effusion on either side.  Given her description of onset and current symptoms, I recommended a 6-week course of Flonase, cycled Afrin, and frequent insufflation of the ears.  If her symptoms have not significantly improved in the next 2 weeks, I asked that she contact my office and we will get her set up with an audiogram and to see one of my partners for possible PE tube placement.  Based on her exam and the fact that she has popping and crackling, I do not think a PE tube is likely.  Patient is in agreement with the plan.  All questions were answered to patient's satisfaction.    This note was created using speech recognition transcription software. Despite proofreading, several typographical errors might be present that might affect the meaning of the content. Please call with any questions.  Patient ID: Rochelle Hamlin is a 84 y.o. female.    Ear cerumen removal    Date/Time: 5/9/2024 9:44 AM    Performed by: MAURICIO Green  Authorized by: MAURICIO Green    Consent:     Consent obtained:  Verbal    Consent given by:  Patient    Risks discussed:  Pain    Alternatives discussed:  No treatment  Procedure details:     Location:  R ear    Procedure type comment:  Alligator    Procedure outcomes: cerumen removed    Post-procedure details:      Inspection:  No bleeding, ear canal clear and TM intact    Hearing quality:  Normal    Procedure completion:  Tolerated well, no immediate complications  Comments:      No effusion or retraction noted.      Heaven Correa, RO-CNP 05/09/24 9:40 AM

## 2024-05-09 NOTE — PATIENT INSTRUCTIONS
Your eustachian tubes have not been working properly due to your recent sinus infection.  There is no sign of an ear infection on exam.  Recommendations:  1. Fluticasone-steroid nasal spray will be sent to your pharmacy. Use 1 spray each side morning and night ×6 weeks.  Remember to angle outward when spraying.  2. Get Afrin nasal spray which is over-the-counter. Use 2 sprays each side morning and night for 3 days in a row, then take 1 day off. You may repeat a total of 3 cycles.  3. Gently pop your ears 10 times per day.    If symptoms have not significantly improved in 2 weeks, please contact my office at [phone].

## 2024-05-21 ENCOUNTER — APPOINTMENT (OUTPATIENT)
Dept: PAIN MEDICINE | Facility: HOSPITAL | Age: 84
End: 2024-05-21
Payer: MEDICARE

## 2024-05-23 ENCOUNTER — OFFICE VISIT (OUTPATIENT)
Dept: PAIN MEDICINE | Facility: HOSPITAL | Age: 84
End: 2024-05-23
Payer: MEDICARE

## 2024-05-23 DIAGNOSIS — M54.16 LUMBAR RADICULOPATHY: ICD-10-CM

## 2024-05-23 DIAGNOSIS — Z86.59 HISTORY OF CLAUSTROPHOBIA: ICD-10-CM

## 2024-05-23 PROCEDURE — 99214 OFFICE O/P EST MOD 30 MIN: CPT | Performed by: ANESTHESIOLOGY

## 2024-05-23 PROCEDURE — 1125F AMNT PAIN NOTED PAIN PRSNT: CPT | Performed by: ANESTHESIOLOGY

## 2024-05-23 RX ORDER — DIAZEPAM 10 MG/1
10 TABLET ORAL ONCE AS NEEDED
Qty: 1 TABLET | Refills: 0 | Status: SHIPPED | OUTPATIENT
Start: 2024-05-23

## 2024-05-23 ASSESSMENT — PAIN SCALES - GENERAL: PAINLEVEL: 7

## 2024-05-23 NOTE — PROGRESS NOTES
Chief Complaint   Patient presents with    Extremity Pain    Back Pain     History Of Present Illness  Rochelle Hamlin is a 84 y.o. female with a past medical history of back pain. She recently has a L3-4 lumbar epidural steroid injection on 3/11/24 which provided 90% relief for her back pain but is still endorsing 3/10 left sided sciatic pain. The pain is worse when standing and walking. She takes Tylenol with minimal relief. She is hopeful to control this pain in order to continue golfing. The pain causes significant stress in the patient's life, specifically interferes with general activity, mood, walking ability, ability to perform tasks at home and/or work.  Patient participates in physical therapy and continues to perform physician directed exercises at home. Denies any bowel or bladder incontinence, saddle anesthesia, worsening pain, weakness or falls.     Past Medical History  She has a past medical history of Vitamin D deficiency, unspecified (10/14/2014).    Surgical History  She has no past surgical history on file.     Social History  She reports that she has never smoked. She has never used smokeless tobacco. No history on file for alcohol use and drug use.    Family History  No family history on file.     Allergies  Codeine and Penicillins    Review of Symptoms:   Constitutional: Negative for chills, diaphoresis or fever  HENT: Negative for neck swelling  Eyes:.  Negative for eye pain  Respiratory:.  Negative for cough, shortness of breath or wheezing    Cardiovascular:.  Negative for chest pain or palpitations  Gastrointestinal:.  Negative for abdominal pain, nausea and vomiting  Genitourinary:.  Negative for urgency  Musculoskeletal:  Positive for back pain. Positive for joint pain. Denies falls within the past 3 months.  Skin: Negative for wounds or itching   Neurological: Negative for dizziness, seizures, loss of consciousness and weakness  Endo/Heme/Allergies: Does not bruise/bleed  easily  Psychiatric/Behavioral: Negative for depression. The patient does not appear anxious.       PHYSICAL EXAM  Vitals signs reviewed  Constitutional:       General: Not in acute distress     Appearance: Normal appearance. Not ill-appearing.  HENT:     Head: Normocephalic and atraumatic  Eyes:     Conjunctiva/sclera: Conjunctivae normal  Cardiovascular:     Rate and Rhythm: Normal rate and regular rhythm  Pulmonary:     Effort: No respiratory distress  Abdominal:     Palpations: Abdomen is soft  Musculoskeletal: WADE  Skin:     General: Skin is warm and dry  Neurological:     General: No focal deficit present  Psychiatric:         Mood and Affect: Mood normal         Behavior: Behavior normal    Advanced Exam   Inspection: No gross deformities, no surgical scars  Palpation: No tenderness of patient of lumbar midline, lumbar paraspinals, bilateral SI joints  ROM: Normal range of motion of the lumbar flexion extension  Motor: 5-5 strength upper and lower extremities  Sensory: Negative for sensory abnormalities in upper and lower extremities  Reflexes: 2+ reflexes bilateral upper and lower extremities  Lumbar: Positive left straight leg raising, negative for facet loading  Sacral: Negative Antonette, negative Gaenslen's  Hip: Negative for pain with anterior, lateral, posterior palpation of hip joints, negative FADIR, negative for internal/external rotation of the hip, negative logroll     Last Recorded Vitals  There were no vitals taken for this visit.    Relevant Results  Current Outpatient Medications   Medication Instructions    amLODIPine (NORVASC) 5 mg, oral, Daily    biotin 5,000 mcg tablet, sublingual 1 tablet, sublingual, Daily RT    cholecalciferol (VITAMIN D-3) 1,000 Units, oral    DULoxetine (CYMBALTA) 20 mg, oral, Daily RT    esomeprazole (NexIUM) 40 mg DR capsule 1 capsule, oral    ezetimibe (ZETIA) 10 mg, oral, Daily RT    fluticasone (Flonase) 50 mcg/actuation nasal spray 1 spray, Each Nostril, 2 times  daily, Shake gently. Before first use, prime pump. After use, clean tip and replace cap.    gabapentin (NEURONTIN) 100 mg, oral, 2 times daily    levothyroxine (SYNTHROID, LEVOXYL) 75 mcg, oral, Daily before breakfast    lisinopril 10 mg tablet oral       No results found for this or any previous visit from the past 1000 days.     ASSESSMENT/PLAN  Rochelle Hamlin is a 84 y.o. female with a past medical history of back pain. She recently has a L3-4 lumbar epidural steroid injection on 3/11 which provided 90% relief for her back pain but is still endorsing 3/10 left sided sciatic pain. The pain is worse when standing and walking. She takes Tylenol with minimal relief. Also she has been working with physical therapy with mild improvement. Patient endorses claustrophobia and in order to complete an MRI would need premedication. MRI will be ordered to evaluate lumbar spine.     While the patient does rated her pain at a 3 out of 10 she said it is severe and impairs her from doing her activities of daily living, and for her to manage her day-to-day life she would need to pursue more measures to help with her pain care and treatment.    Our plan is as follows:  - Order Lumbar MRI with premedication 30 minutes before to help guide further management.  We may consider more targeted pain procedures, intermediate pain procedures, or referral to spine surgery pending MRI  - Continue to participate in physical therapy as well as physician directed home exercises  - Continue pain medications as prescribed     Patient seen and discussed with Dr. Rucker.     Scott Angulo,

## 2024-05-30 ENCOUNTER — TREATMENT (OUTPATIENT)
Dept: PHYSICAL THERAPY | Facility: CLINIC | Age: 84
End: 2024-05-30
Payer: MEDICARE

## 2024-05-30 DIAGNOSIS — M54.16 LUMBAR RADICULOPATHY: Primary | ICD-10-CM

## 2024-05-30 PROCEDURE — 97110 THERAPEUTIC EXERCISES: CPT | Mod: GP | Performed by: PHYSICAL THERAPIST

## 2024-05-30 PROCEDURE — 97140 MANUAL THERAPY 1/> REGIONS: CPT | Mod: GP | Performed by: PHYSICAL THERAPIST

## 2024-05-30 NOTE — PROGRESS NOTES
Physical Therapy  Physical Therapy Treatment Note    Patient Name: Rochelle Hamlin  MRN: [id number]  Today's Date: 5/30/2024  Time Calculation  Start Time: 0812  Stop Time: 0858  Time Calculation (min): 46 min       PT Therapeutic Procedures Time Entry  Manual Therapy Time Entry: 30  Therapeutic Exercise Time Entry: 15     Referring:Tyson Begum MD    Insurance:  Visit number: 7 of [place]    Authorization info: No auth  Insurance Type: Payor: [facility] MEDICARE / Plan: [employer] MEDICARE / Product Type: *No Product type* /     Current Problem  1. Lumbar radiculopathy                General   2 month history of acute low back pain. When it started, she went to the [facility]. She was told to do some exercises, and sent home. She has had some improvement although there is intermittent pain into the left buttock and leg. She has not had a steroid taper and physical therapy. Still having L side radic from her back to the knee on the outside. Sleeping with a pillow between her legs     Precautions   3/11 L3-4 lumbar epidural steroid injection under fluoroscopic guidance , : X-rays demonstrate multilevel disc degeneration. Small lumbar curve. Multilevel spondylosis.     Subjective:   Subjective   Fatigued from golf, she is feeling better, not as much low back pain   Pain    L side Radic to the hip, mild L>R LBP  Objective:   Objective   ROM  3/5/2024  Trunk  Flexion within normal limits  Extension 50% limited  Right left rotation mary alice 25% limited with mild pain  Hip flexion bilaterally within normal limits, hip external rotation bilaterally within normal limits, internal rotation limited by 10 to 15 degrees  Hamstring flexibility 45 degrees popliteal angle bilaterally  Central PA glides of the lumbar spine do show mild restriction at L4 and L5.  Strength  3/5/2024  4/5 hip flexion on the L, all other areas WNL    Treatments:   Ther Ex  Reviewed HEP  Bike x 5  Trunk rotations x 20  SKTC 1 x  "60\"  BTB clamshells 3 x 10  Supine march x 20  Swiss ball roll x 15      Manual    STM, L sided lumbar parspinals, Long axis distraction mary, L>R     PT Therapeutic Procedures Time Entry  Manual Therapy Time Entry: 30  Therapeutic Exercise Time Entry: 15     HEP Access Code:  Assessment:  Low back pain is better, Radic is mildly worse this AM down the lateral aspect of the L leg.      Plan: Continue plan of care, MRI in 2 weeks     Goals:  Active       PT Problem       PT Goal 1 (Progressing)       Start:  03/05/24    Expected End:  05/13/24       1.Patient to be independent with HEP for progressions and carryover    2. Patient to report pain less than 3/10 for return to daily activities with less radicular pain    3. Patient to have improved ability to transfer with out assistance to assist with returning to daily activities independently    4. Improved Oswestry disability outcome measure for improved pain and function with daily activities    5.  Patient able to return to golf without limitations from low back                 Lg Ying, PT  "

## 2024-06-04 ENCOUNTER — TREATMENT (OUTPATIENT)
Dept: PHYSICAL THERAPY | Facility: CLINIC | Age: 84
End: 2024-06-04
Payer: MEDICARE

## 2024-06-04 ENCOUNTER — APPOINTMENT (OUTPATIENT)
Dept: RADIOLOGY | Facility: CLINIC | Age: 84
End: 2024-06-04
Payer: MEDICARE

## 2024-06-04 DIAGNOSIS — M54.16 LUMBAR RADICULOPATHY: Primary | ICD-10-CM

## 2024-06-04 PROCEDURE — 97110 THERAPEUTIC EXERCISES: CPT | Mod: GP | Performed by: PHYSICAL THERAPIST

## 2024-06-04 PROCEDURE — 97140 MANUAL THERAPY 1/> REGIONS: CPT | Mod: GP | Performed by: PHYSICAL THERAPIST

## 2024-06-04 NOTE — PROGRESS NOTES
Physical Therapy  Physical Therapy Treatment Note    Patient Name: Rochelle Hamlin  MRN: [id number]  Today's Date: 6/4/2024  Time Calculation  Start Time: 1127  Stop Time: 1205  Time Calculation (min): 38 min       PT Therapeutic Procedures Time Entry  Manual Therapy Time Entry: 30  Therapeutic Exercise Time Entry: 8     Referring:Tyson Begum MD    Insurance:  Visit number: 8 of MN    Authorization info: No auth  Insurance Type: Payor: [facility] MEDICARE / Plan: [employer] MEDICARE / Product Type: *No Product type* /     Current Problem  1. Lumbar radiculopathy                  General   2 month history of acute low back pain. When it started, she went to the [facility]. She was told to do some exercises, and sent home. She has had some improvement although there is intermittent pain into the left buttock and leg. She has not had a steroid taper and physical therapy. Still having L side radic from her back to the knee on the outside. Sleeping with a pillow between her legs     Precautions   3/11 L3-4 lumbar epidural steroid injection under fluoroscopic guidance , : X-rays demonstrate multilevel disc degeneration. Small lumbar curve. Multilevel spondylosis.     Subjective:   Subjective   Sciatica is bad today, feels like she has taken a few steps back. Upcoming MRI  Pain    L side Radic to the hip, mild L>R LBP  Objective:   Objective   ROM  3/5/2024  Trunk  Flexion within normal limits  Extension 50% limited  Right left rotation mary alice 25% limited with mild pain  Hip flexion bilaterally within normal limits, hip external rotation bilaterally within normal limits, internal rotation limited by 10 to 15 degrees  Hamstring flexibility 45 degrees popliteal angle bilaterally  Central PA glides of the lumbar spine do show mild restriction at L4 and L5.  Strength  3/5/2024  4/5 hip flexion on the L, all other areas WNL    Treatments:   Ther Ex  Reviewed HEP  Bike x 5  Trunk rotations x 20  SKTC 1  "x 60\"  BTB clamshells 3 x 10  Supine march x 20  Swiss ball roll x 15      Manual    STM, L sided lumbar parspinals, Long axis distraction mary, L>R     PT Therapeutic Procedures Time Entry  Manual Therapy Time Entry: 30  Therapeutic Exercise Time Entry: 8     HEP Access Code:  Assessment:  symptoms are worse today, very flared up without much relief post manual treatment. She had been making steady progress. Pain limits motion and exercise tolerance today     Plan: She will focus on HEP, physician follow up after MRI. Further treatment pending     Goals:  Active       PT Problem       PT Goal 1 (Progressing)       Start:  03/05/24    Expected End:  05/13/24       1.Patient to be independent with HEP for progressions and carryover    2. Patient to report pain less than 3/10 for return to daily activities with less radicular pain    3. Patient to have improved ability to transfer with out assistance to assist with returning to daily activities independently    4. Improved Oswestry disability outcome measure for improved pain and function with daily activities    5.  Patient able to return to golf without limitations from low back                 Lg Ying, PT  "

## 2024-06-10 ENCOUNTER — APPOINTMENT (OUTPATIENT)
Dept: PHYSICAL THERAPY | Facility: CLINIC | Age: 84
End: 2024-06-10
Payer: MEDICARE

## 2024-06-13 ENCOUNTER — APPOINTMENT (OUTPATIENT)
Dept: RADIOLOGY | Facility: CLINIC | Age: 84
End: 2024-06-13
Payer: MEDICARE

## 2024-06-20 ENCOUNTER — APPOINTMENT (OUTPATIENT)
Dept: RADIOLOGY | Facility: CLINIC | Age: 84
End: 2024-06-20
Payer: MEDICARE

## 2024-08-26 ENCOUNTER — APPOINTMENT (OUTPATIENT)
Dept: INTEGRATIVE MEDICINE | Facility: CLINIC | Age: 84
End: 2024-08-26

## 2024-08-26 DIAGNOSIS — M54.59 OTHER LOW BACK PAIN: Primary | ICD-10-CM

## 2024-08-26 PROCEDURE — 97139 UNLISTED THERAPEUTIC PX: CPT | Performed by: ACUPUNCTURIST

## 2024-08-26 NOTE — PROGRESS NOTES
Acupuncture Visit:     Subjective   Patient ID: Rochelle Hamlin is a 84 y.o. female who presents for No chief complaint on file.  Seeking support low back pain and sciatic like symptoms with muscle fatigue and weakness after golfing.          Session Information  Is this acupuncture treatment being billed to the patient's insurance company: No  Visit Type: Follow-up visit         Review of Systems         Provider reviewed plan for the acupuncture session, precautions and contraindications. Patient/guardian/hospital staff has given consent to treat with full understanding of what to expect during the session. Before acupuncture began, provider explained to the patient to communicate at any time if the procedure was causing discomfort past their tolerance level. Patient agreed to advise acupuncturist. The acupuncturist counseled the patient on the risks of acupuncture treatment including pain, infection, bleeding, and no relief of pain. The patient was positioned comfortably. There was no evidence of infection at the site of needle insertions.    Objective   Physical Exam         Treatment Plan  Treatment Goals: Pain management    Acupuncture Treatment  Patient Position: Seated and reclining  Needle Guage: 42 guage /.14/ Lime green seirin, 40 guage /.16/ Red seirin  Body Points - Left: si 3, gb 41  Body Points - Bilateral: st 36, k 7, 10, sp 3.2  Body Points - Right: sj 5, ub 62  Needle Count In: 12  Needle Count Out: 12  Needle Retention Time (min): 30 minutes  Total Face to Face Time (min): 25 minutes              Assessment/Plan

## 2024-08-27 ENCOUNTER — APPOINTMENT (OUTPATIENT)
Dept: ENDOCRINOLOGY | Facility: CLINIC | Age: 84
End: 2024-08-27
Payer: MEDICARE

## 2024-08-27 ENCOUNTER — LAB (OUTPATIENT)
Dept: LAB | Facility: LAB | Age: 84
End: 2024-08-27
Payer: MEDICARE

## 2024-08-27 VITALS — BODY MASS INDEX: 27.44 KG/M2 | SYSTOLIC BLOOD PRESSURE: 124 MMHG | WEIGHT: 150 LBS | DIASTOLIC BLOOD PRESSURE: 74 MMHG

## 2024-08-27 DIAGNOSIS — E88.810 DYSMETABOLIC SYNDROME X: ICD-10-CM

## 2024-08-27 DIAGNOSIS — E06.3 HASHIMOTO'S THYROIDITIS: ICD-10-CM

## 2024-08-27 DIAGNOSIS — E03.9 HYPOTHYROIDISM, UNSPECIFIED TYPE: ICD-10-CM

## 2024-08-27 DIAGNOSIS — E03.9 HYPOTHYROIDISM, UNSPECIFIED TYPE: Primary | ICD-10-CM

## 2024-08-27 DIAGNOSIS — I10 PRIMARY HYPERTENSION: ICD-10-CM

## 2024-08-27 LAB — TSH SERPL-ACNC: 0.04 MIU/L (ref 0.44–3.98)

## 2024-08-27 PROCEDURE — 84443 ASSAY THYROID STIM HORMONE: CPT

## 2024-08-27 PROCEDURE — 36415 COLL VENOUS BLD VENIPUNCTURE: CPT

## 2024-08-27 PROCEDURE — 1159F MED LIST DOCD IN RCRD: CPT | Performed by: INTERNAL MEDICINE

## 2024-08-27 PROCEDURE — 3078F DIAST BP <80 MM HG: CPT | Performed by: INTERNAL MEDICINE

## 2024-08-27 PROCEDURE — 3074F SYST BP LT 130 MM HG: CPT | Performed by: INTERNAL MEDICINE

## 2024-08-27 PROCEDURE — 99214 OFFICE O/P EST MOD 30 MIN: CPT | Performed by: INTERNAL MEDICINE

## 2024-08-27 NOTE — PROGRESS NOTES
Patient ID: Rochelle Hamlin is a 84 y.o. female who presents for Follow-up.  HPI  he patient comes in for follow up.    She has hypothyroidism hyperlipidemia insulin resistance and vitamin D deficiency.    She continues on levothyroxine 75 mcg daily.    She has had issues with labile blood pressure and is now on losartan through cardiology.    She notes myalgias in her lower extremities and brittle nails.    She has been intolerant of simvastatin Lescol Lipitor Crestor and Livalo.    She remains frustrated by her weight.  She does note that she does dine out a fair bit which impacts her ability to lose weight.    Physically she has no other complaints.      ROS  Comprehensive review of systems is negative.    Objective   Physical Exam  Visit Vitals  /74      Vitals:    08/27/24 0858   Weight: 68 kg (150 lb)      Body mass index is 27.44 kg/m².      Weight 150 down 3 pounds for total of 16    Eyes normal  ENT normal. No adenopathy  Thyroid palpable and normal. No nodules  Chest clear to auscultation  Heart sounds are normal  Abdomen nontender. Bowel sounds normal. No organomegaly  Feet are okay    Current Outpatient Medications   Medication Sig Dispense Refill    cholecalciferol (Vitamin D-3) 25 MCG (1000 UT) capsule Take 1 capsule (25 mcg) by mouth.      diazePAM (Valium) 10 mg tablet Take 1 tablet (10 mg) by mouth 1 time if needed for sedation for up to 1 dose. 1/2-1 tab half hr before mri 1 tablet 0    ezetimibe (Zetia) 10 mg tablet Take 1 tablet (10 mg) by mouth once daily.      fluticasone (Flonase) 50 mcg/actuation nasal spray Administer 1 spray into each nostril 2 times a day. Shake gently. Before first use, prime pump. After use, clean tip and replace cap. 16 g 11    levothyroxine (Synthroid, Levoxyl) 75 mcg tablet Take 1 tablet (75 mcg) by mouth once daily in the morning. Take before meals. 90 tablet 3    lisinopril 10 mg tablet Take by mouth.       No current facility-administered medications for  this visit.       Assessment/Plan     1.  Hypothyroidism secondary to Hashimoto's  2.  Insulin resistance  3.  Hyperlipidemia    Will check TSH and make adjustments if warranted.    We discussed her weight and the fact that she is ultimately heading in the right direction.    She will work on diet and exercise.    We again discussed the nonspecificity with regards to thyroid symptoms.    We again discussed variables are going to thyroid hormone requirements.    She will follow-up with me in 1 year or sooner as needed.

## 2024-08-29 DIAGNOSIS — E03.9 HYPOTHYROIDISM, UNSPECIFIED TYPE: ICD-10-CM

## 2024-08-29 DIAGNOSIS — E05.90 HYPERTHYROIDISM: ICD-10-CM

## 2024-08-29 RX ORDER — LEVOTHYROXINE SODIUM 50 UG/1
50 TABLET ORAL
Qty: 30 TABLET | Refills: 2 | Status: SHIPPED | OUTPATIENT
Start: 2024-08-29

## 2024-09-02 ENCOUNTER — LAB REQUISITION (OUTPATIENT)
Dept: LAB | Facility: HOSPITAL | Age: 84
End: 2024-09-02
Payer: MEDICARE

## 2024-09-02 DIAGNOSIS — N30.00 ACUTE CYSTITIS WITHOUT HEMATURIA: ICD-10-CM

## 2024-09-02 PROCEDURE — 87186 SC STD MICRODIL/AGAR DIL: CPT

## 2024-09-02 PROCEDURE — 87086 URINE CULTURE/COLONY COUNT: CPT

## 2024-09-06 LAB — BACTERIA UR CULT: ABNORMAL

## 2024-09-08 ENCOUNTER — LAB REQUISITION (OUTPATIENT)
Dept: LAB | Facility: HOSPITAL | Age: 84
End: 2024-09-08
Payer: MEDICARE

## 2024-09-08 DIAGNOSIS — R30.0 DYSURIA: ICD-10-CM

## 2024-09-08 PROCEDURE — 87086 URINE CULTURE/COLONY COUNT: CPT

## 2024-09-10 LAB — BACTERIA UR CULT: NORMAL

## 2024-09-23 ENCOUNTER — APPOINTMENT (OUTPATIENT)
Dept: INTEGRATIVE MEDICINE | Facility: CLINIC | Age: 84
End: 2024-09-23
Payer: MEDICARE

## 2024-11-14 DIAGNOSIS — E05.90 HYPERTHYROIDISM: ICD-10-CM

## 2024-11-16 RX ORDER — LEVOTHYROXINE SODIUM 50 UG/1
50 TABLET ORAL
Qty: 30 TABLET | Refills: 2 | Status: SHIPPED | OUTPATIENT
Start: 2024-11-16

## 2024-11-22 ENCOUNTER — TELEPHONE (OUTPATIENT)
Dept: PRIMARY CARE | Facility: CLINIC | Age: 84
End: 2024-11-22
Payer: MEDICARE

## 2024-11-22 ENCOUNTER — LAB (OUTPATIENT)
Dept: LAB | Facility: LAB | Age: 84
End: 2024-11-22
Payer: MEDICARE

## 2024-11-22 DIAGNOSIS — E03.9 HYPOTHYROIDISM, UNSPECIFIED TYPE: ICD-10-CM

## 2024-11-22 PROCEDURE — 84443 ASSAY THYROID STIM HORMONE: CPT

## 2024-11-22 PROCEDURE — 36415 COLL VENOUS BLD VENIPUNCTURE: CPT

## 2024-11-23 LAB — TSH SERPL-ACNC: 2.02 MIU/L (ref 0.44–3.98)

## 2024-12-27 ENCOUNTER — OFFICE VISIT (OUTPATIENT)
Dept: URGENT CARE | Age: 84
End: 2024-12-27
Payer: MEDICARE

## 2024-12-27 VITALS
TEMPERATURE: 98.2 F | WEIGHT: 144 LBS | SYSTOLIC BLOOD PRESSURE: 180 MMHG | DIASTOLIC BLOOD PRESSURE: 80 MMHG | HEIGHT: 62 IN | RESPIRATION RATE: 19 BRPM | HEART RATE: 88 BPM | BODY MASS INDEX: 26.5 KG/M2 | OXYGEN SATURATION: 97 %

## 2024-12-27 DIAGNOSIS — Z20.822 COVID-19 RULED OUT: ICD-10-CM

## 2024-12-27 DIAGNOSIS — R05.8 OTHER COUGH: Primary | ICD-10-CM

## 2024-12-27 LAB — POC SARS-COV-2 AG BINAX: NORMAL

## 2024-12-27 RX ORDER — AMLODIPINE BESYLATE 2.5 MG/1
2.5 TABLET ORAL
COMMUNITY
Start: 2024-12-17

## 2024-12-27 RX ORDER — LEVOTHYROXINE SODIUM 100 UG/1
TABLET ORAL
COMMUNITY
Start: 2018-03-28

## 2024-12-27 RX ORDER — AMLODIPINE BESYLATE 5 MG/1
5 TABLET ORAL
COMMUNITY
Start: 2024-10-03 | End: 2025-06-15

## 2024-12-27 ASSESSMENT — ENCOUNTER SYMPTOMS
COUGH: 1
PSYCHIATRIC NEGATIVE: 1
ENDOCRINE NEGATIVE: 1
CARDIOVASCULAR NEGATIVE: 1
NEUROLOGICAL NEGATIVE: 1
HEMATOLOGIC/LYMPHATIC NEGATIVE: 1
MUSCULOSKELETAL NEGATIVE: 1
GASTROINTESTINAL NEGATIVE: 1
ALLERGIC/IMMUNOLOGIC NEGATIVE: 1
CONSTITUTIONAL NEGATIVE: 1
EYES NEGATIVE: 1

## 2024-12-28 NOTE — PROGRESS NOTES
"Subjective   Patient ID: Rochelle Hamlin is a 84 y.o. female. They present today with a chief complaint of Request For Covid Testing (Covid test).    History of Present Illness    History provided by:  Patient   used: No    Cough  This is a new problem. The current episode started today. The problem has been unchanged. The cough is Non-productive. Nothing aggravates the symptoms.       Past Medical History  Allergies as of 12/27/2024 - Reviewed 12/27/2024   Allergen Reaction Noted    Codeine Nausea/vomiting 03/11/2024    Penicillins Rash 03/11/2024       (Not in a hospital admission)       Past Medical History:   Diagnosis Date    Vitamin D deficiency, unspecified 10/14/2014    Vitamin D deficiency       No past surgical history on file.     reports that she has never smoked. She has never used smokeless tobacco.    Review of Systems  Review of Systems   Constitutional: Negative.    HENT: Negative.     Eyes: Negative.    Respiratory:  Positive for cough.    Cardiovascular: Negative.    Gastrointestinal: Negative.    Endocrine: Negative.    Genitourinary: Negative.    Musculoskeletal: Negative.    Allergic/Immunologic: Negative.    Neurological: Negative.    Hematological: Negative.    Psychiatric/Behavioral: Negative.     All other systems reviewed and are negative.                                 Objective    Vitals:    12/27/24 2003   BP: 180/80   BP Location: Left arm   Patient Position: Sitting   BP Cuff Size: Small adult   Pulse: 88   Resp: 19   Temp: 36.8 °C (98.2 °F)   TempSrc: Oral   SpO2: 97%   Weight: 65.3 kg (144 lb)   Height: 1.575 m (5' 2\")     No LMP recorded.    Physical Exam  Vitals and nursing note reviewed.   Constitutional:       Appearance: Normal appearance. She is normal weight.   HENT:      Head: Normocephalic and atraumatic.      Right Ear: Tympanic membrane normal.      Left Ear: Tympanic membrane normal.      Nose: Nose normal.      Mouth/Throat:      Mouth: Mucous " membranes are dry.   Cardiovascular:      Rate and Rhythm: Normal rate and regular rhythm.      Pulses: Normal pulses.   Pulmonary:      Effort: Pulmonary effort is normal.      Breath sounds: Normal breath sounds.   Abdominal:      General: Abdomen is flat. Bowel sounds are normal.      Palpations: Abdomen is soft.   Skin:     General: Skin is warm.   Neurological:      General: No focal deficit present.      Mental Status: She is alert and oriented to person, place, and time. Mental status is at baseline.   Psychiatric:         Mood and Affect: Mood normal.         Behavior: Behavior normal.         Thought Content: Thought content normal.         Judgment: Judgment normal.         Procedures    Point of Care Test & Imaging Results from this visit  Results for orders placed or performed in visit on 12/27/24   POCT Covid-19 Rapid Antigen   Result Value Ref Range    POC MAURI-COV-2 AG  Presumptive negative test for SARS-CoV-2 (no antigen detected)     Presumptive negative test for SARS-CoV-2 (no antigen detected)      No results found.    Diagnostic study results (if any) were reviewed by MAURICIO Reece.    Assessment/Plan   Allergies, medications, history, and pertinent labs/EKGs/Imaging reviewed by MAURICIO Reece.     Medical Decision Making  Medical Decision Making  At time of discharge patient was clinically well-appearing and HDS for outpatient management. The patient and/or family was educated regarding diagnosis, supportive care, OTC and Rx medications. The patient and/or family was given the opportunity to ask questions prior to discharge.  They verbalized understanding of my discussion of the plans for treatment, expected course, indications to return to  or seek further evaluation in ED, and the need for timely follow up as directed.   They were provided with a work/school excuse if requested.        Orders and Diagnoses  Diagnoses and all orders for this visit:  Other  "cough  COVID-19 ruled out  -     POCT Covid-19 Rapid Antigen    B/p elevated, pt states her b/p is high every night before she takes her amlodipine.  Denies any symptoms. States her son is a \"MD at [employer] [employer] [facility] [employer] and is aware\".        Patient disposition: Home    Electronically signed by RO Reece-CNP  8:33 PM      "

## 2025-02-07 ENCOUNTER — OFFICE VISIT (OUTPATIENT)
Dept: URGENT CARE | Age: 85
End: 2025-02-07
Payer: MEDICARE

## 2025-02-07 VITALS
SYSTOLIC BLOOD PRESSURE: 146 MMHG | RESPIRATION RATE: 19 BRPM | OXYGEN SATURATION: 97 % | WEIGHT: 144 LBS | DIASTOLIC BLOOD PRESSURE: 67 MMHG | TEMPERATURE: 98.3 F | BODY MASS INDEX: 26.5 KG/M2 | HEART RATE: 76 BPM | HEIGHT: 62 IN

## 2025-02-07 DIAGNOSIS — J22 LOWER RESPIRATORY TRACT INFECTION: Primary | ICD-10-CM

## 2025-02-07 DIAGNOSIS — J02.9 SORE THROAT: ICD-10-CM

## 2025-02-07 DIAGNOSIS — R05.1 ACUTE COUGH: ICD-10-CM

## 2025-02-07 LAB
POC RAPID INFLUENZA A: NEGATIVE
POC RAPID INFLUENZA B: NEGATIVE
POC RAPID STREP: NEGATIVE

## 2025-02-07 RX ORDER — AZITHROMYCIN 250 MG/1
TABLET, FILM COATED ORAL
Qty: 6 TABLET | Refills: 0 | Status: SHIPPED | OUTPATIENT
Start: 2025-02-07

## 2025-02-07 RX ORDER — BENZONATATE 200 MG/1
200 CAPSULE ORAL 3 TIMES DAILY PRN
Qty: 30 CAPSULE | Refills: 0 | Status: SHIPPED | OUTPATIENT
Start: 2025-02-07 | End: 2025-02-17

## 2025-02-07 ASSESSMENT — PAIN SCALES - GENERAL: PAINLEVEL_OUTOF10: 6

## 2025-02-07 NOTE — PROGRESS NOTES
"Subjective   History of Present Illness: Rochelle Hamlin is a 84 y.o. female. They present today with a chief complaint of sore throat, cough, and nasal congestion x 3 days.  Patient have had COVID about a month ago.  She denies chest pain, shortness of breath, palpitation, dizziness.    Past Medical History  Allergies as of 02/07/2025 - Reviewed 02/07/2025   Allergen Reaction Noted    Codeine Nausea/vomiting 03/11/2024    Penicillins Rash 03/11/2024       (Not in a hospital admission)       Past Medical History:   Diagnosis Date    Vitamin D deficiency, unspecified 10/14/2014    Vitamin D deficiency       History reviewed. No pertinent surgical history.     reports that she has never smoked. She has never used smokeless tobacco.    Review of Systems  Review of Systems                               Objective    Vitals:    02/07/25 1736   BP: 146/67   BP Location: Left arm   Patient Position: Sitting   BP Cuff Size: Small adult   Pulse: 76   Resp: 19   Temp: 36.8 °C (98.3 °F)   TempSrc: Oral   SpO2: 97%   Weight: 65.3 kg (144 lb)   Height: 1.575 m (5' 2\")     No LMP recorded. (Menstrual status: Menopausal).    Physical Exam  Vitals reviewed.   HENT:      Head: Normocephalic and atraumatic.      Right Ear: Tympanic membrane and ear canal normal. No tenderness.      Left Ear: Tympanic membrane and ear canal normal. No tenderness.      Nose: Nose normal. No congestion or rhinorrhea.      Mouth/Throat:      Mouth: Mucous membranes are moist.      Pharynx: Oropharynx is clear. Uvula midline.   Eyes:      Extraocular Movements: Extraocular movements intact.      Conjunctiva/sclera: Conjunctivae normal.      Pupils: Pupils are equal, round, and reactive to light.   Cardiovascular:      Rate and Rhythm: Normal rate and regular rhythm.      Heart sounds: No murmur heard.  Pulmonary:      Effort: Pulmonary effort is normal.      Breath sounds: Normal breath sounds. No decreased breath sounds, wheezing, rhonchi or rales. "   Skin:     General: Skin is warm.   Neurological:      Mental Status: She is alert and oriented to person, place, and time.   Psychiatric:         Mood and Affect: Mood normal.         Behavior: Behavior normal.             Point of Care Test & Imaging Results from this visit  No results found for this visit on 02/07/25.   No results found.    Diagnostic study results (if any) were reviewed by Jaguar Gamboa PA-C.    Assessment/Plan   Allergies, medications, history, and pertinent labs/EKGs/Imaging reviewed by Jaguar Gamboa PA-C.     Medical Decision Making  -Negative flu and rapid strep tests.  Will treat with benzonatate and azithromycin for cough and lower respiratory infection.  -         Patient is educated about their diagnoses.     -          Discussed medications benefits and adverse effects.     -          Answered all patient’s questions.     -          Patient will call 911 or go to the nearest ED if worsen symptoms .     -          Patient is agreeable to the plan of care and is deemed stable upon discharge.     -          Follow up with your primary care provider in two days.      Orders and Diagnoses  Diagnoses and all orders for this visit:  Lower respiratory tract infection  -     azithromycin (Zithromax) 250 mg tablet; Take 2 tabs (500 mg) by mouth today, then take 1 tab daily for 4 days.  Sore throat  -     POCT Influenza A/B manually resulted  -     POCT rapid strep A manually resulted  Acute cough  -     benzonatate (Tessalon) 200 mg capsule; Take 1 capsule (200 mg) by mouth 3 times a day as needed for cough for up to 10 days. Do not crush or chew.      Medical Admin Record      Patient disposition: Home    Electronically signed by Jaguar Gamboa PA-C  6:06 PM

## 2025-02-24 DIAGNOSIS — E05.90 HYPERTHYROIDISM: ICD-10-CM

## 2025-02-27 RX ORDER — LEVOTHYROXINE SODIUM 50 UG/1
50 TABLET ORAL
Qty: 30 TABLET | Refills: 2 | Status: SHIPPED | OUTPATIENT
Start: 2025-02-27

## 2025-05-01 ENCOUNTER — APPOINTMENT (OUTPATIENT)
Dept: PRIMARY CARE | Facility: CLINIC | Age: 85
End: 2025-05-01
Payer: MEDICARE

## 2025-05-05 ENCOUNTER — OFFICE VISIT (OUTPATIENT)
Dept: URGENT CARE | Age: 85
End: 2025-05-05
Payer: MEDICARE

## 2025-05-05 VITALS
TEMPERATURE: 98 F | OXYGEN SATURATION: 98 % | HEART RATE: 82 BPM | DIASTOLIC BLOOD PRESSURE: 76 MMHG | RESPIRATION RATE: 16 BRPM | SYSTOLIC BLOOD PRESSURE: 159 MMHG

## 2025-05-05 DIAGNOSIS — R09.81 NASAL CONGESTION: ICD-10-CM

## 2025-05-05 DIAGNOSIS — J02.9 SORE THROAT: ICD-10-CM

## 2025-05-05 LAB
POC HUMAN RHINOVIRUS PCR: NEGATIVE
POC INFLUENZA A VIRUS PCR: NEGATIVE
POC INFLUENZA B VIRUS PCR: NEGATIVE
POC RESPIRATORY SYNCYTIAL VIRUS PCR: NEGATIVE
POC SARS-COV-2 AG BINAX: NORMAL
POC STREPTOCOCCUS PYOGENES (GROUP A STREP) PCR: NEGATIVE

## 2025-05-05 PROCEDURE — 1159F MED LIST DOCD IN RCRD: CPT | Performed by: STUDENT IN AN ORGANIZED HEALTH CARE EDUCATION/TRAINING PROGRAM

## 2025-05-05 PROCEDURE — 1125F AMNT PAIN NOTED PAIN PRSNT: CPT | Performed by: STUDENT IN AN ORGANIZED HEALTH CARE EDUCATION/TRAINING PROGRAM

## 2025-05-05 PROCEDURE — 87631 RESP VIRUS 3-5 TARGETS: CPT | Performed by: STUDENT IN AN ORGANIZED HEALTH CARE EDUCATION/TRAINING PROGRAM

## 2025-05-05 PROCEDURE — 99213 OFFICE O/P EST LOW 20 MIN: CPT | Performed by: STUDENT IN AN ORGANIZED HEALTH CARE EDUCATION/TRAINING PROGRAM

## 2025-05-05 PROCEDURE — 1036F TOBACCO NON-USER: CPT | Performed by: STUDENT IN AN ORGANIZED HEALTH CARE EDUCATION/TRAINING PROGRAM

## 2025-05-05 PROCEDURE — 3078F DIAST BP <80 MM HG: CPT | Performed by: STUDENT IN AN ORGANIZED HEALTH CARE EDUCATION/TRAINING PROGRAM

## 2025-05-05 PROCEDURE — 3077F SYST BP >= 140 MM HG: CPT | Performed by: STUDENT IN AN ORGANIZED HEALTH CARE EDUCATION/TRAINING PROGRAM

## 2025-05-05 PROCEDURE — 87651 STREP A DNA AMP PROBE: CPT | Performed by: STUDENT IN AN ORGANIZED HEALTH CARE EDUCATION/TRAINING PROGRAM

## 2025-05-05 PROCEDURE — 87811 SARS-COV-2 COVID19 W/OPTIC: CPT | Performed by: STUDENT IN AN ORGANIZED HEALTH CARE EDUCATION/TRAINING PROGRAM

## 2025-05-05 ASSESSMENT — ENCOUNTER SYMPTOMS: SORE THROAT: 1

## 2025-05-05 ASSESSMENT — PAIN SCALES - GENERAL: PAINLEVEL_OUTOF10: 4

## 2025-05-05 NOTE — PATIENT INSTRUCTIONS
I advise rest, fluids, Flonase nasal spray once daily, nasal saline rinses 2-3 times daily, humidifier in bedroom at night, Claritin or Zyrtec once daily for the next two weeks to help with post nasal drip and/or feeling of fullness in the ears from congestion.    Dissolving 1 tsp of salt into warm water and gargling as needed can be helpful for sore throat. Honey can help to coat and soothe the throat as well. Honey is also an excellent cough suppressant. You can swallow 1 tsp honey plain or dissolve it into warm tea/liquid as needed.       Please follow up with your primary provider, go to the emergency room, or return to urgent care if symptoms do not improve or worsen.  You may schedule an appointment online at [email or web address] or call [phone]. Go to the Emergency Department if symptoms significantly worsen or if you develop symptoms including but not limited to chest pain or shortness of breath.

## 2025-05-05 NOTE — PROGRESS NOTES
Subjective   Patient ID: Rochelle Hamlin is a 85 y.o. female. They present today with a chief complaint of Sore Throat (X2 days ) and Nasal Congestion (X today ).    History of Present Illness  Nasal congestion starting today, sore throat x 2 days. Was around many of her grandchildren this past weekend prior to sx. Taking tylenol and drinking tea without relief. There is some pain with swallowing but not difficulty. No fever chills cough cp sob ear pain abd pain nv headache dizziness       History provided by:  Patient  Sore Throat   Associated symptoms include congestion.       Past Medical History  Allergies as of 05/05/2025 - Reviewed 05/05/2025   Allergen Reaction Noted    Codeine Nausea/vomiting 03/11/2024    Penicillins Rash 03/11/2024       Prescriptions Prior to Admission[1]     Medical History[2]    Surgical History[3]     reports that she has never smoked. She has never used smokeless tobacco.    Review of Systems  Review of Systems   HENT:  Positive for congestion and sore throat.    All other systems reviewed and are negative.                                 Objective    Vitals:    05/05/25 1846   BP: 159/76   BP Location: Left arm   Patient Position: Sitting   Pulse: 82   Resp: 16   Temp: 36.7 °C (98 °F)   SpO2: 98%     No LMP recorded. (Menstrual status: Menopausal).    Physical Exam  Vitals and nursing note reviewed.   Constitutional:       General: She is not in acute distress.     Appearance: Normal appearance. She is not ill-appearing, toxic-appearing or diaphoretic.   HENT:      Head: Normocephalic and atraumatic.      Right Ear: Tympanic membrane, ear canal and external ear normal.      Left Ear: Tympanic membrane, ear canal and external ear normal.      Nose: Congestion present.      Right Nostril: No foreign body, epistaxis, septal hematoma or occlusion.      Left Nostril: No epistaxis, septal hematoma or occlusion.      Right Turbinates: Not enlarged, swollen or pale.      Left Turbinates:  Not enlarged, swollen or pale.      Right Sinus: No maxillary sinus tenderness or frontal sinus tenderness.      Left Sinus: No maxillary sinus tenderness or frontal sinus tenderness.      Mouth/Throat:      Lips: Pink.      Mouth: Mucous membranes are moist.      Dentition: Normal dentition.      Tongue: No lesions.      Pharynx: Uvula midline. Posterior oropharyngeal erythema and postnasal drip present. No pharyngeal swelling, oropharyngeal exudate or uvula swelling.      Tonsils: No tonsillar exudate or tonsillar abscesses.      Comments: No uvular edema or deviation. No tonsillar edema, asymmetry, exudates or evidence of PTA. No trismus drooling or stridor. Speaking in full and clear sentences. Airway is patent. Tolerating oral secretions. No dental abnormality. No neck swelling. No lesions or petechiae. No sublingual or submandibular induration edema or tenderness.   Cardiovascular:      Rate and Rhythm: Normal rate and regular rhythm.      Pulses: Normal pulses.      Heart sounds: No murmur heard.  Pulmonary:      Effort: Pulmonary effort is normal. No respiratory distress.      Breath sounds: No wheezing, rhonchi or rales.   Neurological:      Mental Status: She is alert.         Procedures    Point of Care Test & Imaging Results from this visit  Results for orders placed or performed in visit on 05/05/25   POCT SP[facility]FI[facility]ST [facility] manually resulted   Result Value Ref Range    POC Group A Strep, PCR Negative Negative    POC Respiratory Syncytial Virus PCR Negative Negative    POC Influenza A Virus PCR Negative Negative    POC Influenza B Virus PCR Negative Negative    POC Human Rhinovirus PCR Negative Negative   POCT Covid-19 Rapid Antigen   Result Value Ref Range    POC MAURI-COV-2 AG  Presumptive negative test for SARS-CoV-2 (no antigen detected)     Presumptive negative test for SARS-CoV-2 (no antigen detected)      Imaging  No results found.    Cardiology, Vascular, and Other  Imaging  No other imaging results found for the past 2 days      Diagnostic study results (if any) were reviewed by Anahi Paiz PA-C.    Assessment/Plan   Allergies, medications, history, and pertinent labs/EKGs/Imaging reviewed by Anahi Paiz PA-C.     Medical Decision Making  Suspect viral etiology  I did not feel abx were indicated at this time  Lower concern for pta, ludwigs or other deep space neck infection  Advised supportive care  Return, follow up with pcp for worsening new or non improving symptoms     Orders and Diagnoses  Diagnoses and all orders for this visit:  Sore throat  -     POCT SPOTFIRE R/ST Panel Mini w/Strep A ([facility]) manually resulted  -     POCT Covid-19 Rapid Antigen  Nasal congestion  -     POCT Covid-19 Rapid Antigen      Medical Admin Record      Patient disposition: Home    Electronically signed by Anahi Paiz PA-C  7:33 PM           [1] (Not in a hospital admission)  [2]   Past Medical History:  Diagnosis Date    Vitamin D deficiency, unspecified 10/14/2014    Vitamin D deficiency   [3] No past surgical history on file.

## 2025-05-15 ENCOUNTER — HOSPITAL ENCOUNTER (OUTPATIENT)
Dept: RADIOLOGY | Facility: CLINIC | Age: 85
Discharge: HOME | End: 2025-05-15
Payer: MEDICARE

## 2025-05-15 DIAGNOSIS — M79.675 PAIN IN LEFT TOE(S): ICD-10-CM

## 2025-05-15 PROCEDURE — 73630 X-RAY EXAM OF FOOT: CPT | Mod: LT

## 2025-05-15 PROCEDURE — 73630 X-RAY EXAM OF FOOT: CPT | Mod: LEFT SIDE | Performed by: RADIOLOGY

## 2025-05-21 ENCOUNTER — OFFICE VISIT (OUTPATIENT)
Facility: CLINIC | Age: 85
End: 2025-05-21
Payer: MEDICARE

## 2025-05-21 VITALS
BODY MASS INDEX: 27.44 KG/M2 | WEIGHT: 150 LBS | DIASTOLIC BLOOD PRESSURE: 64 MMHG | OXYGEN SATURATION: 97 % | HEART RATE: 68 BPM | SYSTOLIC BLOOD PRESSURE: 120 MMHG

## 2025-05-21 DIAGNOSIS — G95.20 SPINAL CORD COMPRESSION (MULTI): ICD-10-CM

## 2025-05-21 DIAGNOSIS — J43.9 PULMONARY EMPHYSEMA, UNSPECIFIED EMPHYSEMA TYPE (MULTI): ICD-10-CM

## 2025-05-21 DIAGNOSIS — C34.11 MALIGNANT NEOPLASM OF UPPER LOBE OF RIGHT LUNG (MULTI): ICD-10-CM

## 2025-05-21 DIAGNOSIS — J44.89 COPD WITH ASTHMA (MULTI): ICD-10-CM

## 2025-05-21 DIAGNOSIS — J42 CHRONIC BRONCHITIS, UNSPECIFIED CHRONIC BRONCHITIS TYPE (MULTI): ICD-10-CM

## 2025-05-21 DIAGNOSIS — R07.9 CHEST PAIN, UNSPECIFIED TYPE: Primary | ICD-10-CM

## 2025-05-21 DIAGNOSIS — J44.9 CHRONIC OBSTRUCTIVE PULMONARY DISEASE, UNSPECIFIED COPD TYPE (MULTI): ICD-10-CM

## 2025-05-21 DIAGNOSIS — N18.31 STAGE 3A CHRONIC KIDNEY DISEASE (MULTI): ICD-10-CM

## 2025-05-21 PROCEDURE — 99214 OFFICE O/P EST MOD 30 MIN: CPT | Performed by: INTERNAL MEDICINE

## 2025-05-21 PROCEDURE — 3078F DIAST BP <80 MM HG: CPT | Performed by: INTERNAL MEDICINE

## 2025-05-21 PROCEDURE — 3074F SYST BP LT 130 MM HG: CPT | Performed by: INTERNAL MEDICINE

## 2025-05-21 PROCEDURE — G2211 COMPLEX E/M VISIT ADD ON: HCPCS | Performed by: INTERNAL MEDICINE

## 2025-05-21 PROCEDURE — 1159F MED LIST DOCD IN RCRD: CPT | Performed by: INTERNAL MEDICINE

## 2025-05-21 PROCEDURE — 1125F AMNT PAIN NOTED PAIN PRSNT: CPT | Performed by: INTERNAL MEDICINE

## 2025-05-21 RX ORDER — GABAPENTIN 100 MG/1
100 CAPSULE ORAL 2 TIMES DAILY
COMMUNITY

## 2025-05-21 RX ORDER — METOPROLOL TARTRATE 100 MG/1
100 TABLET ORAL AS NEEDED
Qty: 2 TABLET | Refills: 0 | Status: SHIPPED | OUTPATIENT
Start: 2025-05-21 | End: 2026-05-21

## 2025-05-21 RX ORDER — OMEPRAZOLE 40 MG/1
40 CAPSULE, DELAYED RELEASE ORAL
COMMUNITY

## 2025-05-21 ASSESSMENT — PATIENT HEALTH QUESTIONNAIRE - PHQ9
SUM OF ALL RESPONSES TO PHQ9 QUESTIONS 1 AND 2: 0
1. LITTLE INTEREST OR PLEASURE IN DOING THINGS: NOT AT ALL
2. FEELING DOWN, DEPRESSED OR HOPELESS: NOT AT ALL

## 2025-05-21 ASSESSMENT — ENCOUNTER SYMPTOMS
LOSS OF SENSATION IN FEET: 1
OCCASIONAL FEELINGS OF UNSTEADINESS: 0
DEPRESSION: 0

## 2025-05-21 ASSESSMENT — PAIN SCALES - GENERAL: PAINLEVEL_OUTOF10: 2

## 2025-05-21 ASSESSMENT — LIFESTYLE VARIABLES: TOTAL SCORE: 0

## 2025-05-21 NOTE — PROGRESS NOTES
[facility] [place]    Interventional Cardiology    Visit Type: Established Patient       Reason for Visit: former patient at [facility]             Assessment and Plan  ?       Rochelle Hamlin is a very pleasant 85 y.o. female who presents for ***          Problem List Items Addressed This Visit    None      getting tightness across chest  Not worse with exertion but worse with eating  Non radiating  No associated symptoms       Follow up: 1 year        It was a great pleasure seeing Rochelle Hamlin in our office today.  Please contact me with any questions.       Anupam Boothe MD MPH  Interventional Cardiologist/[facility] [place]    Subjective  ?       Rochelle Hamlin is a very pleasant 85 y.o. 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  [facility]             Review of Systems       Allergies[1]      Medications Prior to Visit[2]       Medical History[3]       Surgical History[4]      Social History     Tobacco Use    Smoking status: Former     Current packs/day: 0.00     Average packs/day: 0.3 packs/day for 10.0 years (2.5 ttl pk-yrs)     Types: Cigarettes     Start date: 3/1/1959     Quit date: 3/1/1967     Years since quittin.2    Smokeless tobacco: Never   Substance Use Topics    Alcohol use: Not Currently     Alcohol/week: 1.0 standard drink of alcohol     Types: 1 Glasses of wine per week          Family History[5]        Objective  ?       Vitals:    25 1504   BP: 120/64   Pulse: 68   SpO2: 97%   Weight: 68 kg (150 lb)      Body mass index is 27.44 kg/m².      Physical Exam           Data Reviewed and Summarized  ?       Labs: personally reviewed   Lab Results   Component Value Date    WBC 8.5 2023    HGB 13.6 2023    HCT 39.6  "06/21/2023    MCV 84 06/21/2023     06/21/2023      Lab Results   Component Value Date    GLUCOSE 92 06/21/2023    CALCIUM 10.2 06/21/2023     06/21/2023    K 3.6 06/21/2023    CO2 25 06/21/2023     06/21/2023    BUN 30 (H) 06/21/2023    CREATININE 0.91 06/21/2023      [unfilled]   Lab Results   Component Value Date    CHOL 218 (H) 11/21/2017      Lab Results   Component Value Date    TRIG 84 11/21/2017      Lab Results   Component Value Date    HDL 65.2 11/21/2017      No results found for: \"LDLCALC\"      Imaging/Testing: personally reviewed   No results found for: \"LVEF\"        [1]   Allergies  Allergen Reactions    Codeine Nausea/vomiting    Penicillins Rash   [2]   Outpatient Medications Prior to Visit   Medication Sig Dispense Refill    amLODIPine (Norvasc) 2.5 mg tablet Take 1 tablet (2.5 mg) by mouth.      amLODIPine (Norvasc) 5 mg tablet Take 1 tablet (5 mg) by mouth once daily.      cholecalciferol (Vitamin D-3) 25 MCG (1000 UT) capsule Take 1 capsule (1,000 Units) by mouth.      ezetimibe (Zetia) 10 mg tablet Take 1 tablet (10 mg) by mouth once daily.      fluticasone (Flonase) 50 mcg/actuation nasal spray Administer 1 spray into each nostril 2 times a day. Shake gently. Before first use, prime pump. After use, clean tip and replace cap. 16 g 11    gabapentin (Neurontin) 100 mg capsule Take 1 capsule (100 mg) by mouth 2 times a day. Unsure of dosage      levothyroxine (Synthroid, Levoxyl) 50 mcg tablet TAKE 1 TABLET BY MOUTH ONCE DAILY IN THE MORNING BEFORE MEAL 30 tablet 2    azithromycin (Zithromax) 250 mg tablet Take 2 tabs (500 mg) by mouth today, then take 1 tab daily for 4 days. (Patient not taking: Reported on 5/21/2025) 6 tablet 0    diazePAM (Valium) 10 mg tablet Take 1 tablet (10 mg) by mouth 1 time if needed for sedation for up to 1 dose. 1/2-1 tab half hr before mri (Patient not taking: Reported on 5/21/2025) 1 tablet 0    levothyroxine (Synthroid, Levoxyl) 100 mcg tablet Take " by mouth. (Patient not taking: Reported on 5/21/2025)      lisinopril 10 mg tablet Take by mouth. (Patient not taking: Reported on 5/21/2025)       No facility-administered medications prior to visit.   [3]   Past Medical History:  Diagnosis Date    Cancer (Multi)     GERD (gastroesophageal reflux disease)     HL (hearing loss)     Hypertension     Low back pain     Neck pain     TMJ dysfunction     Vitamin D deficiency, unspecified 10/14/2014    Vitamin D deficiency   [4]   Past Surgical History:  Procedure Laterality Date    NECK SURGERY     [5]   Family History  Problem Relation Name Age of Onset    Diabetes Father Jake Durand       mouth once daily in the morning. Take before meals. Do not crush or chew.      levothyroxine (Synthroid, Levoxyl) 50 mcg tablet TAKE 1 TABLET BY MOUTH ONCE DAILY IN THE MORNING BEFORE MEAL 30 tablet 2    azithromycin (Zithromax) 250 mg tablet Take 2 tabs (500 mg) by mouth today, then take 1 tab daily for 4 days. (Patient not taking: Reported on 5/21/2025) 6 tablet 0    diazePAM (Valium) 10 mg tablet Take 1 tablet (10 mg) by mouth 1 time if needed for sedation for up to 1 dose. 1/2-1 tab half hr before mri (Patient not taking: Reported on 5/21/2025) 1 tablet 0    levothyroxine (Synthroid, Levoxyl) 100 mcg tablet Take by mouth. (Patient not taking: Reported on 5/21/2025)      lisinopril 10 mg tablet Take by mouth. (Patient not taking: Reported on 5/21/2025)       No facility-administered medications prior to visit.   [3]   Past Medical History:  Diagnosis Date    Cancer (Multi)     GERD (gastroesophageal reflux disease)     HL (hearing loss)     Hypertension     Low back pain     Neck pain     TMJ dysfunction     Vitamin D deficiency, unspecified 10/14/2014    Vitamin D deficiency   [4]   Past Surgical History:  Procedure Laterality Date    NECK SURGERY     [5]   Family History  Problem Relation Name Age of Onset    Diabetes Father Jake Durand

## 2025-05-21 NOTE — LETTER
May 29, 2025     Oxana Valdez MD  [address] [facility] [address]    Patient: Rochelle Hamlin   YOB: 1940   Date of Visit: 5/21/2025       Dear Dr. Oxana Valdez MD:    Thank you for referring Rochelle Hamlin to me for evaluation. Below are my notes for this consultation.  If you have questions, please do not hesitate to call me. I look forward to following your patient along with you.       Sincerely,     Anupam Boothe MD      CC: No Recipients  ______________________________________________________________________________________                      [facility] [place]    Interventional Cardiology    Visit Type: Established Patient       Reason for Visit: former patient at [facility]             Assessment and Plan  ?       Rochelle Hamlin is a very pleasant 85 y.o. female who presents for routine follow-up.    She has had some chest tightness in the past and she underwent a stress echocardiogram which was fairly unremarkable.  However her chest pain persists.  We will plan a coronary CTA with a calcium score.  She has been intolerant of statins in the past.  We will repeat an echocardiogram.    Problem List Items Addressed This Visit    None  Visit Diagnoses         Chest pain, unspecified type    -  Primary    Relevant Medications    metoprolol tartrate (Lopressor) 100 mg tablet    Other Relevant Orders    CT angio coronary art with heartflow if score >30%    Transthoracic echo (TTE) complete    Comprehensive metabolic panel    Follow Up In Cardiology                Follow up: 1 year        It was a great pleasure seeing Rochelle Hamlin in our office today.  Please contact me with any questions.       Anupam Boothe MD MPH  Interventional Cardiologist/[facility] [place]    Subjective  ?       Rochelle Hamlin is a very pleasant 85 y.o.  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  She was a patient of mine at [facility]  She feels relatively well though she does get some tightness across her chest when she eats which she thinks is indigestion.  It is not worse with exertion.  It does not radiate.  It is not associated with any other symptoms such as shortness of breath or nausea or diaphoresis.  She denies any lightheadedness or syncope or edema orthopnea.  She remains fairly active.  She enjoys golfing a lot.           Review of Systems   Constitutional:  Negative for chills, fatigue and fever.   HENT:  Negative for nosebleeds.    Eyes:  Negative for visual disturbance.   Respiratory:  Negative for shortness of breath.    Cardiovascular:  Positive for chest pain. Negative for palpitations and leg swelling.   Gastrointestinal:  Negative for blood in stool.   Genitourinary:  Negative for hematuria.   Musculoskeletal:  Negative for myalgias.   Neurological:  Negative for dizziness, syncope and light-headedness.          Allergies[1]      Medications Prior to Visit[2]       Medical History[3]       Surgical History[4]      Social History     Tobacco Use   • Smoking status: Former     Current packs/day: 0.00     Average packs/day: 0.3 packs/day for 10.0 years (2.5 ttl pk-yrs)     Types: Cigarettes     Start date: 3/1/1959     Quit date: 3/1/1967     Years since quittin.2   • Smokeless tobacco: Never   Substance Use Topics   • Alcohol use: Not Currently     Alcohol/week: 1.0 standard drink of alcohol     Types: 1 Glasses of wine per week          Family History[5]        Objective  ?       Vitals:    25 1504   BP: 120/64   Pulse: 68   SpO2: 97%   Weight: 68 kg (150 lb)      Body mass index is 27.44 kg/m².      Physical Exam  Constitutional:       General: She is not in acute distress.     Appearance: Normal appearance.   HENT:      Head: Normocephalic and  "atraumatic.      Mouth/Throat:      Mouth: Mucous membranes are moist.   Neck:      Vascular: No carotid bruit or JVD.   Cardiovascular:      Rate and Rhythm: Normal rate and regular rhythm.      Pulses:           Dorsalis pedis pulses are 2+ on the right side and 2+ on the left side.        Posterior tibial pulses are 2+ on the right side and 2+ on the left side.      Heart sounds: Normal heart sounds. No murmur heard.  Pulmonary:      Effort: Pulmonary effort is normal.      Breath sounds: Normal breath sounds.   Abdominal:      General: Abdomen is flat.      Palpations: Abdomen is soft.   Musculoskeletal:      Right lower leg: No edema.      Left lower leg: No edema.   Neurological:      General: No focal deficit present.      Mental Status: She is alert and oriented to person, place, and time.   Psychiatric:         Mood and Affect: Mood normal.         Behavior: Behavior normal.                Data Reviewed and Summarized  ?       Labs: personally reviewed   Lab Results   Component Value Date    WBC 8.5 06/21/2023    HGB 13.6 06/21/2023    HCT 39.6 06/21/2023    MCV 84 06/21/2023     06/21/2023      Lab Results   Component Value Date    GLUCOSE 92 06/21/2023    CALCIUM 10.2 06/21/2023     06/21/2023    K 3.6 06/21/2023    CO2 25 06/21/2023     06/21/2023    BUN 30 (H) 06/21/2023    CREATININE 0.91 06/21/2023      [unfilled]   Lab Results   Component Value Date    CHOL 218 (H) 11/21/2017      Lab Results   Component Value Date    TRIG 84 11/21/2017      Lab Results   Component Value Date    HDL 65.2 11/21/2017      No results found for: \"LDLCALC\"      Imaging/Testing: personally reviewed   No results found for: \"LVEF\"          [1]  Allergies  Allergen Reactions   • Codeine Nausea/vomiting   • Penicillins Rash   [2]  Outpatient Medications Prior to Visit   Medication Sig Dispense Refill   • amLODIPine (Norvasc) 2.5 mg tablet Take 1 tablet (2.5 mg) by mouth.     • amLODIPine (Norvasc) 5 mg tablet " Take 1 tablet (5 mg) by mouth once daily.     • cholecalciferol (Vitamin D-3) 25 MCG (1000 UT) capsule Take 1 capsule (1,000 Units) by mouth.     • ezetimibe (Zetia) 10 mg tablet Take 1 tablet (10 mg) by mouth once daily.     • fluticasone (Flonase) 50 mcg/actuation nasal spray Administer 1 spray into each nostril 2 times a day. Shake gently. Before first use, prime pump. After use, clean tip and replace cap. 16 g 11   • gabapentin (Neurontin) 100 mg capsule Take 1 capsule (100 mg) by mouth 2 times a day. Unsure of dosage     • omeprazole (PriLOSEC) 40 mg DR capsule Take 1 capsule (40 mg) by mouth once daily in the morning. Take before meals. Do not crush or chew.     • levothyroxine (Synthroid, Levoxyl) 50 mcg tablet TAKE 1 TABLET BY MOUTH ONCE DAILY IN THE MORNING BEFORE MEAL 30 tablet 2   • azithromycin (Zithromax) 250 mg tablet Take 2 tabs (500 mg) by mouth today, then take 1 tab daily for 4 days. (Patient not taking: Reported on 5/21/2025) 6 tablet 0   • diazePAM (Valium) 10 mg tablet Take 1 tablet (10 mg) by mouth 1 time if needed for sedation for up to 1 dose. 1/2-1 tab half hr before mri (Patient not taking: Reported on 5/21/2025) 1 tablet 0   • levothyroxine (Synthroid, Levoxyl) 100 mcg tablet Take by mouth. (Patient not taking: Reported on 5/21/2025)     • lisinopril 10 mg tablet Take by mouth. (Patient not taking: Reported on 5/21/2025)       No facility-administered medications prior to visit.   [3]  Past Medical History:  Diagnosis Date   • Cancer (Multi)    • GERD (gastroesophageal reflux disease)    • HL (hearing loss)    • Hypertension    • Low back pain    • Neck pain    • TMJ dysfunction    • Vitamin D deficiency, unspecified 10/14/2014    Vitamin D deficiency   [4]  Past Surgical History:  Procedure Laterality Date   • NECK SURGERY     [5]  Family History  Problem Relation Name Age of Onset   • Diabetes Father Jake Durand         [1]  Allergies  Allergen Reactions   • Codeine Nausea/vomiting   •  Penicillins Rash   [2]  Outpatient Medications Prior to Visit   Medication Sig Dispense Refill   • amLODIPine (Norvasc) 2.5 mg tablet Take 1 tablet (2.5 mg) by mouth.     • amLODIPine (Norvasc) 5 mg tablet Take 1 tablet (5 mg) by mouth once daily.     • cholecalciferol (Vitamin D-3) 25 MCG (1000 UT) capsule Take 1 capsule (1,000 Units) by mouth.     • ezetimibe (Zetia) 10 mg tablet Take 1 tablet (10 mg) by mouth once daily.     • fluticasone (Flonase) 50 mcg/actuation nasal spray Administer 1 spray into each nostril 2 times a day. Shake gently. Before first use, prime pump. After use, clean tip and replace cap. 16 g 11   • gabapentin (Neurontin) 100 mg capsule Take 1 capsule (100 mg) by mouth 2 times a day. Unsure of dosage     • omeprazole (PriLOSEC) 40 mg DR capsule Take 1 capsule (40 mg) by mouth once daily in the morning. Take before meals. Do not crush or chew.     • levothyroxine (Synthroid, Levoxyl) 50 mcg tablet TAKE 1 TABLET BY MOUTH ONCE DAILY IN THE MORNING BEFORE MEAL 30 tablet 2   • azithromycin (Zithromax) 250 mg tablet Take 2 tabs (500 mg) by mouth today, then take 1 tab daily for 4 days. (Patient not taking: Reported on 5/21/2025) 6 tablet 0   • diazePAM (Valium) 10 mg tablet Take 1 tablet (10 mg) by mouth 1 time if needed for sedation for up to 1 dose. 1/2-1 tab half hr before mri (Patient not taking: Reported on 5/21/2025) 1 tablet 0   • levothyroxine (Synthroid, Levoxyl) 100 mcg tablet Take by mouth. (Patient not taking: Reported on 5/21/2025)     • lisinopril 10 mg tablet Take by mouth. (Patient not taking: Reported on 5/21/2025)       No facility-administered medications prior to visit.   [3]  Past Medical History:  Diagnosis Date   • Cancer (Multi)    • GERD (gastroesophageal reflux disease)    • HL (hearing loss)    • Hypertension    • Low back pain    • Neck pain    • TMJ dysfunction    • Vitamin D deficiency, unspecified 10/14/2014    Vitamin D deficiency   [4]  Past Surgical  History:  Procedure Laterality Date   • NECK SURGERY     [5]  Family History  Problem Relation Name Age of Onset   • Diabetes Father Jake Wareky

## 2025-05-23 DIAGNOSIS — E05.90 HYPERTHYROIDISM: ICD-10-CM

## 2025-05-23 RX ORDER — LEVOTHYROXINE SODIUM 50 UG/1
50 TABLET ORAL
Qty: 30 TABLET | Refills: 2 | Status: SHIPPED | OUTPATIENT
Start: 2025-05-23

## 2025-05-29 PROBLEM — N18.31 STAGE 3A CHRONIC KIDNEY DISEASE (MULTI): Status: ACTIVE | Noted: 2025-05-29

## 2025-05-29 PROBLEM — J44.9 CHRONIC OBSTRUCTIVE PULMONARY DISEASE, UNSPECIFIED COPD TYPE (MULTI): Status: ACTIVE | Noted: 2025-05-29

## 2025-05-29 PROBLEM — C34.11 MALIGNANT NEOPLASM OF UPPER LOBE OF RIGHT LUNG (MULTI): Status: ACTIVE | Noted: 2025-05-29

## 2025-05-29 PROBLEM — G95.20 SPINAL CORD COMPRESSION (MULTI): Status: ACTIVE | Noted: 2025-05-29

## 2025-05-29 PROBLEM — J43.9 PULMONARY EMPHYSEMA, UNSPECIFIED EMPHYSEMA TYPE (MULTI): Status: ACTIVE | Noted: 2025-05-29

## 2025-05-29 PROBLEM — J44.89 COPD WITH ASTHMA (MULTI): Status: ACTIVE | Noted: 2025-05-29

## 2025-05-29 PROBLEM — J42 CHRONIC BRONCHITIS, UNSPECIFIED CHRONIC BRONCHITIS TYPE (MULTI): Status: ACTIVE | Noted: 2025-05-29

## 2025-05-29 ASSESSMENT — ENCOUNTER SYMPTOMS
DIZZINESS: 0
HEMATURIA: 0
MYALGIAS: 0
CHILLS: 0
BLOOD IN STOOL: 0
LIGHT-HEADEDNESS: 0
FEVER: 0
FATIGUE: 0
PALPITATIONS: 0
SHORTNESS OF BREATH: 0

## 2025-06-09 ENCOUNTER — TELEPHONE (OUTPATIENT)
Facility: CLINIC | Age: 85
End: 2025-06-09
Payer: MEDICARE

## 2025-06-09 NOTE — TELEPHONE ENCOUNTER
Received call from patient asking why she has to take metoprolol prior to her Echo. I read the last note, patient was also ordered a CTA. She was unaware that it was ordered, she will call and schedule. I explained to her that the Metoprolol should be taken before the CTA not the Echo as well as labs prior to CTA to check kidney function prior to getting contrast. Patient verbalized understanding. She will call and schedule her CTA

## 2025-06-10 LAB
ALBUMIN SERPL-MCNC: 4.6 G/DL (ref 3.6–5.1)
ALP SERPL-CCNC: 98 U/L (ref 37–153)
ALT SERPL-CCNC: 16 U/L (ref 6–29)
ANION GAP SERPL CALCULATED.4IONS-SCNC: 8 MMOL/L (CALC) (ref 7–17)
AST SERPL-CCNC: 17 U/L (ref 10–35)
BILIRUB SERPL-MCNC: 0.4 MG/DL (ref 0.2–1.2)
BUN SERPL-MCNC: 24 MG/DL (ref 7–25)
CALCIUM SERPL-MCNC: 9.7 MG/DL (ref 8.6–10.4)
CHLORIDE SERPL-SCNC: 105 MMOL/L (ref 98–110)
CO2 SERPL-SCNC: 29 MMOL/L (ref 20–32)
CREAT SERPL-MCNC: 1.02 MG/DL (ref 0.6–0.95)
EGFRCR SERPLBLD CKD-EPI 2021: 54 ML/MIN/1.73M2
GLUCOSE SERPL-MCNC: 97 MG/DL (ref 65–99)
POTASSIUM SERPL-SCNC: 4.8 MMOL/L (ref 3.5–5.3)
PROT SERPL-MCNC: 7.1 G/DL (ref 6.1–8.1)
SODIUM SERPL-SCNC: 142 MMOL/L (ref 135–146)

## 2025-06-11 ENCOUNTER — ANCILLARY PROCEDURE (OUTPATIENT)
Dept: CARDIOLOGY | Facility: CLINIC | Age: 85
End: 2025-06-11
Payer: MEDICARE

## 2025-06-11 DIAGNOSIS — R07.9 CHEST PAIN, UNSPECIFIED TYPE: ICD-10-CM

## 2025-06-11 LAB
AORTIC VALVE MEAN GRADIENT: 4 MMHG
AORTIC VALVE PEAK VELOCITY: 1.27 M/S
AV PEAK GRADIENT: 6 MMHG
AVA (PEAK VEL): 2.02 CM2
AVA (VTI): 2.15 CM2
EJECTION FRACTION APICAL 4 CHAMBER: 70.3
EJECTION FRACTION: 70 %
LEFT ATRIUM VOLUME AREA LENGTH INDEX BSA: 29.2 ML/M2
LEFT VENTRICLE INTERNAL DIMENSION DIASTOLE: 4.17 CM (ref 3.5–6)
LEFT VENTRICULAR OUTFLOW TRACT DIAMETER: 2.03 CM
MITRAL VALVE E/A RATIO: 0.8
RIGHT VENTRICLE FREE WALL PEAK S': 13 CM/S
TRICUSPID ANNULAR PLANE SYSTOLIC EXCURSION: 1.7 CM

## 2025-06-11 PROCEDURE — 93306 TTE W/DOPPLER COMPLETE: CPT

## 2025-06-11 PROCEDURE — 93306 TTE W/DOPPLER COMPLETE: CPT | Performed by: INTERNAL MEDICINE

## 2025-06-13 ENCOUNTER — TELEPHONE (OUTPATIENT)
Facility: CLINIC | Age: 85
End: 2025-06-13
Payer: MEDICARE

## 2025-06-13 NOTE — TELEPHONE ENCOUNTER
----- Message from Anupam Boothe sent at 6/13/2025  7:27 AM EDT -----  Echo fairly unremarkable except for a trivial pericardial effusion.  If chest pain persists, pericarditis is a consideration.  Will continue current workup and management.    ----- Message -----  From: Interface, [facility] - Cardiology Results In  Sent: 6/11/2025   9:07 PM EDT  To: Anupam Boothe MD

## 2025-07-08 ENCOUNTER — APPOINTMENT (OUTPATIENT)
Dept: RADIOLOGY | Facility: HOSPITAL | Age: 85
End: 2025-07-08
Payer: MEDICARE

## 2025-07-16 ENCOUNTER — OFFICE VISIT (OUTPATIENT)
Facility: CLINIC | Age: 85
End: 2025-07-16
Payer: MEDICARE

## 2025-07-16 VITALS
WEIGHT: 150.5 LBS | OXYGEN SATURATION: 95 % | HEART RATE: 78 BPM | BODY MASS INDEX: 27.53 KG/M2 | SYSTOLIC BLOOD PRESSURE: 118 MMHG | DIASTOLIC BLOOD PRESSURE: 60 MMHG

## 2025-07-16 DIAGNOSIS — R07.9 CHEST PAIN, UNSPECIFIED TYPE: ICD-10-CM

## 2025-07-16 PROCEDURE — 99211 OFF/OP EST MAY X REQ PHY/QHP: CPT

## 2025-07-16 PROCEDURE — G2211 COMPLEX E/M VISIT ADD ON: HCPCS | Performed by: INTERNAL MEDICINE

## 2025-07-16 PROCEDURE — 3078F DIAST BP <80 MM HG: CPT | Performed by: INTERNAL MEDICINE

## 2025-07-16 PROCEDURE — 3074F SYST BP LT 130 MM HG: CPT | Performed by: INTERNAL MEDICINE

## 2025-07-16 PROCEDURE — 1126F AMNT PAIN NOTED NONE PRSNT: CPT | Performed by: INTERNAL MEDICINE

## 2025-07-16 PROCEDURE — 99214 OFFICE O/P EST MOD 30 MIN: CPT | Performed by: INTERNAL MEDICINE

## 2025-07-16 RX ORDER — ESOMEPRAZOLE MAGNESIUM 40 MG/1
40 CAPSULE, DELAYED RELEASE ORAL
COMMUNITY

## 2025-07-16 ASSESSMENT — PAIN SCALES - GENERAL: PAINLEVEL_OUTOF10: 0-NO PAIN

## 2025-07-16 ASSESSMENT — ENCOUNTER SYMPTOMS
LOSS OF SENSATION IN FEET: 1
OCCASIONAL FEELINGS OF UNSTEADINESS: 0
DEPRESSION: 0

## 2025-07-16 NOTE — PROGRESS NOTES
[facility]    Interventional Cardiology    Visit Type: Established Patient       Reason for Visit: No chief complaint on file.             Assessment and Plan  ?       Rochelle Hamlin is a very pleasant 85 y.o. female who presents for ***          Problem List Items Addressed This Visit    None  Visit Diagnoses         Chest pain, unspecified type                ordererd a coronary cta but canceled due to patient felt much better with gerd treatment.     Chest pain resolved with some GERD treatment  Feels well  Has some back pain, takes gabapentin now, but doesn't like it due to weight gain  Gets some leg pain due to spine, has strong pulses in feet    ?pericarditis, had some trivial pericardial fluid       Follow up: ***        It was a great pleasure seeing Rochelle Hamlin in our office today.  Please contact me with any questions.       Anupam Boothe MD MPH  Interventional Cardiologist/[facility]    Subjective  ?       Rochelle Hamlin is a very pleasant 85 y.o. 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  She was a patient of mine at [facility].             Review of Systems       Allergies[1]      Medications Prior to Visit[2]       Medical History[3]       Surgical History[4]      Social History     Tobacco Use    Smoking status: Former     Current packs/day: 0.00     Average packs/day: 0.3 packs/day for 10.0 years (2.5 ttl pk-yrs)     Types: Cigarettes     Start date: 3/1/1959     Quit date: 3/1/1967     Years since quittin.4    Smokeless tobacco: Never   Substance Use Topics    Alcohol use: Not Currently     Alcohol/week: 1.0 standard drink of alcohol     Types: 1 Glasses of wine per week          Family History[5]        Objective  ?       Vitals:    25 1522  "  BP: 118/60   Pulse: 78   SpO2: 95%   Weight: 68.3 kg (150 lb 8 oz)      Body mass index is 27.53 kg/m².      Physical Exam           Data Reviewed and Summarized  ?       Labs: personally reviewed   Lab Results   Component Value Date    WBC 8.5 06/21/2023    HGB 13.6 06/21/2023    HCT 39.6 06/21/2023    MCV 84 06/21/2023     06/21/2023      Lab Results   Component Value Date    GLUCOSE 97 06/09/2025    CALCIUM 9.7 06/09/2025     06/09/2025    K 4.8 06/09/2025    CO2 29 06/09/2025     06/09/2025    BUN 24 06/09/2025    CREATININE 1.02 (H) 06/09/2025      [unfilled]   Lab Results   Component Value Date    CHOL 218 (H) 11/21/2017      Lab Results   Component Value Date    TRIG 84 11/21/2017      Lab Results   Component Value Date    HDL 65.2 11/21/2017      No results found for: \"LDLCALC\"      Imaging/Testing: personally reviewed   No results found for: \"LVEF\"          [1]   Allergies  Allergen Reactions    Codeine Nausea/vomiting    Penicillins Rash   [2]   Outpatient Medications Prior to Visit   Medication Sig Dispense Refill    amLODIPine (Norvasc) 2.5 mg tablet Take 1 tablet (2.5 mg) by mouth.      cholecalciferol (Vitamin D-3) 25 MCG (1000 UT) capsule Take 1 capsule (1,000 Units) by mouth.      ezetimibe (Zetia) 10 mg tablet Take 1 tablet (10 mg) by mouth once daily.      gabapentin (Neurontin) 100 mg capsule Take 1 capsule (100 mg) by mouth 2 times a day. Unsure of dosage      levothyroxine (Synthroid, Levoxyl) 50 mcg tablet TAKE 1 TABLET BY MOUTH ONCE DAILY IN THE MORNING BEFORE MEAL 30 tablet 2    fluticasone (Flonase) 50 mcg/actuation nasal spray Administer 1 spray into each nostril 2 times a day. Shake gently. Before first use, prime pump. After use, clean tip and replace cap. 16 g 11    omeprazole (PriLOSEC) 40 mg DR capsule Take 1 capsule (40 mg) by mouth once daily in the morning. Take before meals. Do not crush or chew. (Patient not taking: Reported on 7/16/2025)       No " facility-administered medications prior to visit.   [3]   Past Medical History:  Diagnosis Date    Cancer (Multi)     GERD (gastroesophageal reflux disease)     HL (hearing loss)     Hypertension     Low back pain     Neck pain     TMJ dysfunction     Vitamin D deficiency, unspecified 10/14/2014    Vitamin D deficiency   [4]   Past Surgical History:  Procedure Laterality Date    NECK SURGERY     [5]   Family History  Problem Relation Name Age of Onset    Diabetes Father Jake Durand       (40 mg) by mouth once daily in the morning. Take before meals. Do not crush or chew. (Patient not taking: Reported on 7/16/2025)       No facility-administered medications prior to visit.   [3]   Past Medical History:  Diagnosis Date    Cancer (Multi)     GERD (gastroesophageal reflux disease)     HL (hearing loss)     Hypertension     Low back pain     Neck pain     TMJ dysfunction     Vitamin D deficiency, unspecified 10/14/2014    Vitamin D deficiency   [4]   Past Surgical History:  Procedure Laterality Date    LUNG LOBECTOMY      NECK SURGERY     [5]   Family History  Problem Relation Name Age of Onset    Diabetes Father Jake Durand

## 2025-07-16 NOTE — PROGRESS NOTES
Physical Therapy  Physical Therapy Orthopedic Evaluation    Patient Name: Rochelle Hamlin 85 y.o.  MRN: [id number]  Today's Date: 7/17/2025  Time Calculation  Start Time: 0700  Stop Time: 0738  Time Calculation (min): 38 min  Reason for visit: lumbar radiculopathy, neck pain, musculoskeletal pain   Referring MD: Boris Sarmiento  Insurance: MED NEC / NO AUTH / ESTIMATE / UHC MEDICARE / NO DED NO    DX: mary lBP right sciatica, neck pain , weakness       Current Problem  1. Neck pain  Follow Up In Physical Therapy      2. Bilateral low back pain with right-sided sciatica  Follow Up In Physical Therapy      3. Weakness  Follow Up In Physical Therapy          General:  General  Reason for Referral: lumbar radiculopathy, neck pain, musculoskeletal pain  Referred By: Boris Sarmiento  General Comment: not a fall risk  Precautions:  Precautions  STEADI Fall Risk Score (The score of 4 or more indicates an increased risk of falling): 1  Pain:       Subjective:     Subjective   Chief Complaint: My spine is bad.  MD said I need surgery and I do not want that.  My neck does ache at time, but I had a massage and it is better.  Main complaint is the low back.    Onset: couple of years   STEPHANIE: unknown     Current Condition: same/unchanged     PAIN  Intensity (0-10): 4-5/10 at the most, 0/10 currently   Location: Low back and down right leg at times to my ankle  Description: ache     Aggravating Factors:  walking   Relieving Factors:  exercise     Relevant Information (PMH & Previous Tests/Imaging):   PMH:. Scoliosis, Cervical spine surgery Fusion C3/4 and disc spacer C3/4  Fusion C5-C7 fusion    MRI lumbar   IMPRESSION:   1. Scoliosis and multilevel spondylosis with moderate to severe canal narrowing at L2-3 and L3-4 with central bundling of the nerve roots of the cauda equina.   2.  Varying levels of neural foraminal stenosis, moderate to severe on the right at L5-S1.     Previous Interventions/Treatments: injections, facet  blocks, prior therapy,     Prior Level of Function (PLOF)  Exercise/Physical Activity: Was going to the [employer] for upper body,  has not for about 3 weeks.  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      Treatment Goals: I want to strengthen the muscles around my back     Red Flags: Do you have any of the following? No   Fever/chills, unexplained weight changes, dizziness/fainting, unexplained change in bowel or bladder functions, unexplained malaise or muscle weakness, night pain/sweats, numbness or tingling  Medical history reviewed:  yes (scanned in chart)    Objective:  Objective       Observation/Posture: rounded shoulders,fwd head   Gait: w/o a.d     Transfers:  sit <> stand: independent  bed mobility: moderate difficulty  Balance  SL Balance: L= 15 ; R= 10        Lumbar ROM  Flex=MOD  Ext=MATEUSZ  Side bend: L=WFL [] R=WFL  Rotation:    L=WFL [] R=WFL    Special tests:  SLR = positive  LANCE = negative   FADIR = negative   Piriformis = positive    Flexibility  Hamstrings: MOD  Piriformis: MIN  Quads: MOD  Hip flexors: MOD    Strength  Abdominals= Fair  Bridge = Fair 75%     Hip Strength MMT  Flex: L= 4+/5 [] R= 4+/5   Abd L= 4/5 [] R= 4/5    Ext L= 4/5 [] R= 4/5    Knee Strength        Extension: L= 5/5 [] R= 5/5  Flexion:  L= 5/5 [] R= 5/5    Ankle Strength MMT  DF L= 5/5 [] R= 5/5   PF L= 5/5 [] R= 5/5    Lumbar repeated movements 2/10 low back to start  RFIS- 1-2/10 - a little beter  AUSTYN- no pain noted   Supine 4/10 low back pain  RFIL- 4/10 left side low back   Prone - 2/10 low back   JACK- 1/10 - better     Cervical spine ROM (has 3 level fusion)   Flex=MIN  Ext=MOD  Left rotation= MOD  Right rotation=MOD  Left side bend=MATEUSZ  Right side bend=MATEUSZ             Outcome Measures:  Other Measures  Oswestry Disablity Index (WILLIE): not completed priro to eval due to late  arrival     EDUCATION: home exercise program, plan of care, activity modifications, pain management, and injury pathology       Goals:  Active       PT Problem       PT Goal 1       Start:  07/17/25    Expected End:  08/14/25       Patient will demonstrate good understanding and compliance with HEP          PT Goal 2       Start:  07/17/25    Expected End:  09/11/25       Dec pain 50% or better to improve function in ADL's   Abdominal strength good grade to improve stability and transfers   Hip strength 4+/5 or better to improve stability and dec pain   Decrease Oswestry score by 6 points                Treatments:   Access Code: [email or web address]  URL: [email or web address]  Date: 07/17/2025  Prepared by: Tay Parada    Exercises  - Static Prone on Elbows  - 4-6 x daily - 1 reps -   hold - 3-5 minutes   - Modified Bill Stretch  - 2-3 x daily - 1 sets - 3-4 reps - 30 hold  - Supine Piriformis Stretch with Foot on Ground  - 2-3 x daily - 3-4 reps - 30 hold  - Supine Hamstring Stretch  - 2-3 x daily - 3-4 reps - 30 hold  - Supine Bridge  - 1-2 x daily - 2-3 sets - 10 reps - 3-5 hold  - Clamshell  - 1-2 x daily - 2-3 sets - 10 reps    Assessment: Patient presents with signs and symptoms consistent with Lumbar DDD and neck , resulting in limited participation in pain-free ADLs and inability to perform at their prior level of function. Pt would benefit from physical therapy to address the impairments found & listed previously in the objective section in order to return to safe and pain-free ADLs and prior level of function.     Pain, Impaired balance, Impaired transfers, Decreased flexibility, Decreased strength, Limitations to normal ADL's, and Decreased knowledge of HEP     Clinical presentation:  Stable   Level of Complexity low     Plan:   Certification Period Start Date: 07/17/25  Certification Period End Date: 10/15/25  Rehab Potential: Good  Plan of Care Agreement: Patient  Planned Interventions  include: therapeutic exercise, self-care home management, manual therapy, therapeutic activities, gait training, neuromuscular coordination, aquatic therapy, modalities PRN  Frequency: 1 /week   Duration: 6-8 weeks Focus on low back and incorporate neck and posture PRN.      Tay Parada, PT  Insurance Auth Information    Date of Evaluation: 25    Onset Date: 2023    Referring Physician: Boris Sarmiento     Surgery in the Last 3 months:  no    CPT Codes: Therapeutic Exercise: 58954 Therapeutic Activity: 04213 Neuromuscular Re-Education: 72615 Manual Therapy: 61962 Gait Trainin Self-Care/Home Management Trainin Mechanical Traction: 40408 Electric Stimulation (Attended): 13223 Electric Stimulation (Unattended): 33669 Vasopneumatic Device: 39099 PT Re-Evaluation: 23439 , Aquatic Therapy 80887    Diagnosis:   Problem List Items Addressed This Visit           ICD-10-CM    Lumbar radiculopathy M54.16    Neck pain - Primary M54.2    Relevant Orders    Follow Up In Physical Therapy    Weakness R53.1    Relevant Orders    Follow Up In Physical Therapy          Functional Outcome:  Other Measures  Oswestry Disablity Index (WILLIE): not completed priro to [facility] due to late arrival    OT / PT Evaluation complexity:  low    Which of the following best describes the primary reason of therapy: Improving, restoring, or adapting functional mobility or skills    Visits Requested: 8    Date Range: 90 days    Select all conditions that apply: Arthritis Conditions         Tay Parada, PT

## 2025-07-17 ENCOUNTER — EVALUATION (OUTPATIENT)
Dept: PHYSICAL THERAPY | Facility: CLINIC | Age: 85
End: 2025-07-17
Payer: MEDICARE

## 2025-07-17 DIAGNOSIS — R53.1 WEAKNESS: ICD-10-CM

## 2025-07-17 DIAGNOSIS — M54.2 NECK PAIN: Primary | ICD-10-CM

## 2025-07-17 DIAGNOSIS — M54.41 BILATERAL LOW BACK PAIN WITH RIGHT-SIDED SCIATICA: ICD-10-CM

## 2025-07-17 PROCEDURE — 97161 PT EVAL LOW COMPLEX 20 MIN: CPT | Mod: GP | Performed by: PHYSICAL THERAPIST

## 2025-07-17 PROCEDURE — 97110 THERAPEUTIC EXERCISES: CPT | Mod: GP | Performed by: PHYSICAL THERAPIST

## 2025-07-17 NOTE — Clinical Note
July 17, 2025    Boris Sarmiento MD  [address] [facility]   Department Of Neurosciences  [facility] [address]    Patient: Rochelle Hamlin   YOB: 1940   Date of Visit: 7/17/2025       Dear Boris Sarmiento MD  [address] [facility]  [place] [address]    The attached plan of care is being sent to you because your patient’s medical reimbursement requires that you certify the plan of care. Your signature is required to allow uninterrupted insurance coverage.      You may indicate your approval by signing below and faxing this form back to us at Dept Fax: [phone].    Please call Dept: [phone] with any questions or concerns.    Thank you for this referral,        Tay Parada, PT  [facility] [address] [facility]  [id number] [place] [id number]    Payer: Payor: [employer] MEDICARE / Plan: [employer] MEDICARE / Product Type: *No Product type* /                                                                         Date:     Dear Tay Parada, PT,     Re: Ms. Rochelle Hamlin, MRN:[id number]    I certify that I have reviewed the attached plan of care and it is medically necessary for Ms. Rochelle Hamlin (1940) who is under my care.          ______________________________________                    _________________  Provider name and credentials                                           Date and time                                                                                           Plan of Care 7/17/25   Effective from: 7/17/2025  Effective to: 10/15/2025    Plan ID: [id number]            Participants as of Finalize on 7/17/2025    Name Type Comments Contact Info    Boris Sarmiento MD Referring Provider  [phone]       Last Plan Note     Author: Tay Parada PT Status: Incomplete Last edited: 7/17/2025  7:00 AM       Physical Therapy  Physical Therapy Orthopedic Evaluation    Patient Name:  Rochelle Hamlin 85 y.o.  MRN: [id number]  Today's Date: 7/17/2025  Time Calculation  Start Time: 0700  Stop Time: 0738  Time Calculation (min): 38 min  Reason for visit: lumbar radiculopathy, neck pain, musculoskeletal pain   Referring MD: Boris Sarmiento  Insurance: MED NEC / NO AUTH / ESTIMATE / UHC MEDICARE / NO DED NO    DX: mary lBP right sciatica, neck pain , weakness       Current Problem  1. Neck pain  Follow Up In Physical Therapy      2. Bilateral low back pain with right-sided sciatica  Follow Up In Physical Therapy      3. Weakness  Follow Up In Physical Therapy          General:  General  Reason for Referral: lumbar radiculopathy, neck pain, musculoskeletal pain  Referred By: Boris Sarmiento  General Comment: not a fall risk  Precautions:  Precautions  STEADI Fall Risk Score (The score of 4 or more indicates an increased risk of falling): 1  Pain:       Subjective:     Subjective  Chief Complaint: My spine is bad.  MD said I need surgery and I do not want that.  My neck does ache at time, but I had a massage and it is better.  Main complaint is the low back.    Onset: couple of years   STEPHANIE: unknown     Current Condition: same/unchanged     PAIN  Intensity (0-10): 4-5/10 at the most, 0/10 currently   Location: Low back and down right leg at times to my ankle  Description: ache     Aggravating Factors:  walking   Relieving Factors:  exercise     Relevant Information (PMH & Previous Tests/Imaging):   PMH:. Scoliosis, Cervical spine surgery Fusion C3/4 and disc spacer C3/4  Fusion C5-C7 fusion    MRI lumbar   IMPRESSION:   1. Scoliosis and multilevel spondylosis with moderate to severe canal narrowing at L2-3 and L3-4 with central bundling of the nerve roots of the cauda equina.   2.  Varying levels of neural foraminal stenosis, moderate to severe on the right at L5-S1.     Previous Interventions/Treatments: injections, facet blocks, prior therapy,     Prior Level of Function (PLOF)  Exercise/Physical  Activity: Was going to the [employer] for upper body,  has not for about 3 weeks.  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      Treatment Goals: I want to strengthen the muscles around my back     Red Flags: Do you have any of the following? No   Fever/chills, unexplained weight changes, dizziness/fainting, unexplained change in bowel or bladder functions, unexplained malaise or muscle weakness, night pain/sweats, numbness or tingling  Medical history reviewed:  yes (scanned in chart)    Objective:  Objective      Observation/Posture: rounded shoulders,fwd head   Gait: w/o a.d     Transfers:  sit <> stand: independent  bed mobility: moderate difficulty  Balance  SL Balance: L= 15 ; R= 10        Lumbar ROM  Flex=MOD  Ext=MATEUSZ  Side bend: L=WFL [] R=WFL  Rotation:    L=WFL [] R=WFL    Special tests:  SLR = positive  LANCE = negative   FADIR = negative   Piriformis = positive    Flexibility  Hamstrings: MOD  Piriformis: MIN  Quads: MOD  Hip flexors: MOD    Strength  Abdominals= Fair  Bridge = Fair 75%     Hip Strength MMT  Flex: L= 4+/5 [] R= 4+/5   Abd L= 4/5 [] R= 4/5    Ext L= 4/5 [] R= 4/5    Knee Strength        Extension: L= 5/5 [] R= 5/5  Flexion:  L= 5/5 [] R= 5/5    Ankle Strength MMT  DF L= 5/5 [] R= 5/5   PF L= 5/5 [] R= 5/5    Lumbar repeated movements 2/10 low back to start  RFIS- 1-2/10 - a little beter  AUSTYN- no pain noted   Supine 4/10 low back pain  RFIL- 4/10 left side low back   Prone - 2/10 low back   JACK- 1/10 - better     Cervical spine ROM (has 3 level fusion)   Flex=MIN  Ext=MOD  Left rotation= MOD  Right rotation=MOD  Left side bend=MATEUSZ  Right side bend=MATEUSZ             Outcome Measures:  Other Measures  Oswestry Disablity Index (WILLIE): not completed shirin to vikash due to late arrival     EDUCATION: home exercise program, plan of care, activity  modifications, pain management, and injury pathology       Goals:  Active       PT Problem       PT Goal 1       Start:  07/17/25    Expected End:  08/14/25       Patient will demonstrate good understanding and compliance with HEP          PT Goal 2       Start:  07/17/25    Expected End:  09/11/25       Dec pain 50% or better to improve function in ADL's   Abdominal strength good grade to improve stability and transfers   Hip strength 4+/5 or better to improve stability and dec pain   Decrease Oswestry score by 6 points                Treatments:   Access Code: [email or web address]  URL: [email or web address]  Date: 07/17/2025  Prepared by: Tay Parada    Exercises  - Static Prone on Elbows  - 4-6 x daily - 1 reps -   hold - 3-5 minutes   - Modified Bill Stretch  - 2-3 x daily - 1 sets - 3-4 reps - 30 hold  - Supine Piriformis Stretch with Foot on Ground  - 2-3 x daily - 3-4 reps - 30 hold  - Supine Hamstring Stretch  - 2-3 x daily - 3-4 reps - 30 hold  - Supine Bridge  - 1-2 x daily - 2-3 sets - 10 reps - 3-5 hold  - Clamshell  - 1-2 x daily - 2-3 sets - 10 reps    Assessment: Patient presents with signs and symptoms consistent with Lumbar DDD and neck , resulting in limited participation in pain-free ADLs and inability to perform at their prior level of function. Pt would benefit from physical therapy to address the impairments found & listed previously in the objective section in order to return to safe and pain-free ADLs and prior level of function.     Pain, Impaired balance, Impaired transfers, Decreased flexibility, Decreased strength, Limitations to normal ADL's, and Decreased knowledge of HEP     Clinical presentation:  Stable   Level of Complexity low     Plan:   Certification Period Start Date: 07/17/25  Certification Period End Date: 10/15/25  Rehab Potential: Good  Plan of Care Agreement: Patient  Planned Interventions include: therapeutic exercise, self-care home management, manual  therapy, therapeutic activities, gait training, neuromuscular coordination, aquatic therapy, modalities PRN  Frequency: 1 /week   Duration: 6-8 weeks Focus on low back and incorporate neck and posture PRN.      Tay Parada PT  Insurance Auth Information    Date of Evaluation: 25    Onset Date: 2023    Referring Physician: Boris Sarmiento     Surgery in the Last 3 months:  no    CPT Codes: Therapeutic Exercise: 88593 Therapeutic Activity: 62001 Neuromuscular Re-Education: 91692 Manual Therapy: 49520 Gait Trainin Self-Care/Home Management Trainin Mechanical Traction: 25326 Electric Stimulation (Attended): 30816 Electric Stimulation (Unattended): 12674 Vasopneumatic Device: 55991 PT Re-Evaluation: 71057 , Aquatic Therapy 42289    Diagnosis:   Problem List Items Addressed This Visit           ICD-10-CM    Lumbar radiculopathy M54.16    Neck pain - Primary M54.2    Relevant Orders    Follow Up In Physical Therapy    Weakness R53.1    Relevant Orders    Follow Up In Physical Therapy          Functional Outcome:  Other Measures  Oswestry Disablity Index (WILLIE): not completed priro to eval due to late arrival    OT / PT Evaluation complexity:  low    Which of the following best describes the primary reason of therapy: Improving, restoring, or adapting functional mobility or skills    Visits Requested: 8    Date Range: 90 days    Select all conditions that apply: Arthritis Conditions         Tay Parada PT           Current Participants as of 2025    Name Type Comments Contact Info    Boris Sarmiento MD Referring Provider  [phone]    Signature pending

## 2025-07-22 ENCOUNTER — APPOINTMENT (OUTPATIENT)
Dept: PRIMARY CARE | Facility: CLINIC | Age: 85
End: 2025-07-22
Payer: MEDICARE

## 2025-07-22 VITALS
SYSTOLIC BLOOD PRESSURE: 135 MMHG | OXYGEN SATURATION: 94 % | BODY MASS INDEX: 27.23 KG/M2 | WEIGHT: 148 LBS | DIASTOLIC BLOOD PRESSURE: 73 MMHG | HEIGHT: 62 IN | HEART RATE: 69 BPM

## 2025-07-22 DIAGNOSIS — N18.31 STAGE 3A CHRONIC KIDNEY DISEASE (MULTI): ICD-10-CM

## 2025-07-22 DIAGNOSIS — I10 PRIMARY HYPERTENSION: ICD-10-CM

## 2025-07-22 DIAGNOSIS — E06.3 HYPOTHYROIDISM DUE TO HASHIMOTO THYROIDITIS: ICD-10-CM

## 2025-07-22 DIAGNOSIS — K21.9 GASTROESOPHAGEAL REFLUX DISEASE WITHOUT ESOPHAGITIS: ICD-10-CM

## 2025-07-22 DIAGNOSIS — E55.9 VITAMIN D DEFICIENCY: ICD-10-CM

## 2025-07-22 DIAGNOSIS — E53.8 VITAMIN B12 DEFICIENCY: ICD-10-CM

## 2025-07-22 DIAGNOSIS — M54.16 LUMBAR RADICULOPATHY: ICD-10-CM

## 2025-07-22 DIAGNOSIS — E78.2 MIXED HYPERLIPIDEMIA: Primary | ICD-10-CM

## 2025-07-22 DIAGNOSIS — Z85.118 HISTORY OF PRIMARY NON-SMALL CELL CARCINOMA OF LEFT LUNG: ICD-10-CM

## 2025-07-22 DIAGNOSIS — R73.9 ELEVATED BLOOD SUGAR: ICD-10-CM

## 2025-07-22 PROBLEM — R53.1 WEAKNESS: Status: RESOLVED | Noted: 2025-07-17 | Resolved: 2025-07-22

## 2025-07-22 PROBLEM — J43.9 PULMONARY EMPHYSEMA, UNSPECIFIED EMPHYSEMA TYPE (MULTI): Status: RESOLVED | Noted: 2025-05-29 | Resolved: 2025-07-22

## 2025-07-22 PROBLEM — E88.810 DYSMETABOLIC SYNDROME X: Status: RESOLVED | Noted: 2024-01-25 | Resolved: 2025-07-22

## 2025-07-22 PROBLEM — G95.20 SPINAL CORD COMPRESSION (MULTI): Status: RESOLVED | Noted: 2025-05-29 | Resolved: 2025-07-22

## 2025-07-22 PROBLEM — M54.2 NECK PAIN: Status: RESOLVED | Noted: 2025-07-17 | Resolved: 2025-07-22

## 2025-07-22 PROBLEM — C34.11 MALIGNANT NEOPLASM OF UPPER LOBE OF RIGHT LUNG (MULTI): Status: RESOLVED | Noted: 2025-05-29 | Resolved: 2025-07-22

## 2025-07-22 PROBLEM — J44.89 COPD WITH ASTHMA (MULTI): Status: RESOLVED | Noted: 2025-05-29 | Resolved: 2025-07-22

## 2025-07-22 PROBLEM — J42 CHRONIC BRONCHITIS, UNSPECIFIED CHRONIC BRONCHITIS TYPE (MULTI): Status: RESOLVED | Noted: 2025-05-29 | Resolved: 2025-07-22

## 2025-07-22 PROBLEM — Z90.2 S/P LOBECTOMY OF LUNG: Status: ACTIVE | Noted: 2021-07-08

## 2025-07-22 PROCEDURE — 1159F MED LIST DOCD IN RCRD: CPT | Performed by: FAMILY MEDICINE

## 2025-07-22 PROCEDURE — 3075F SYST BP GE 130 - 139MM HG: CPT | Performed by: FAMILY MEDICINE

## 2025-07-22 PROCEDURE — 3078F DIAST BP <80 MM HG: CPT | Performed by: FAMILY MEDICINE

## 2025-07-22 PROCEDURE — 1123F ACP DISCUSS/DSCN MKR DOCD: CPT | Performed by: FAMILY MEDICINE

## 2025-07-22 PROCEDURE — 99214 OFFICE O/P EST MOD 30 MIN: CPT | Performed by: FAMILY MEDICINE

## 2025-07-22 PROCEDURE — 1160F RVW MEDS BY RX/DR IN RCRD: CPT | Performed by: FAMILY MEDICINE

## 2025-07-22 RX ORDER — HYDRALAZINE HYDROCHLORIDE 10 MG/1
10 TABLET, FILM COATED ORAL 3 TIMES DAILY PRN
Qty: 90 TABLET | Refills: 0 | Status: SHIPPED | OUTPATIENT
Start: 2025-07-22

## 2025-07-22 RX ORDER — AMLODIPINE BESYLATE 5 MG/1
TABLET ORAL DAILY
COMMUNITY

## 2025-07-22 ASSESSMENT — PATIENT HEALTH QUESTIONNAIRE - PHQ9
2. FEELING DOWN, DEPRESSED OR HOPELESS: NOT AT ALL
1. LITTLE INTEREST OR PLEASURE IN DOING THINGS: NOT AT ALL
SUM OF ALL RESPONSES TO PHQ9 QUESTIONS 1 AND 2: 0

## 2025-07-22 NOTE — ASSESSMENT & PLAN NOTE
Well-controlled.  She is on amlodipine. Take hydralazine if BP is elevated.    Orders:    hydrALAZINE (Apresoline) 10 mg tablet; Take 1 tablet (10 mg) by mouth 3 times a day as needed (if Systolic BP > 160).    Follow Up In Advanced Primary Care - PCP - [facility]; Future    Follow Up In Advanced Primary Care - PCP - Medicare Annual; Future    CBC; Future    Albumin-Creatinine Ratio, Urine Random; Future    Comprehensive Metabolic Panel; Future

## 2025-07-22 NOTE — PROGRESS NOTES
"Subjective   Patient ID: Rochelle Hamlin is a 85 y.o. female who presents for New Patient Visit.    HPI       The patient is here to establish care.  She is on Zetia for hyperlipidemia, levothyroxine for hypothyroidism, amlodipine for hypertension, Nexium for GERD, gabapentin, vit D, Flonase inhaler as needed.  She is taking these medications as prescribed, tolerating them well without any side effects.   She has a history of lung cancer, COPD with asthma, laryngopharyngeal reflux disease and follows with Dr. Carey, Pulmonary.  She is s/p neck and right upper lobe lobectomy.  Family history is significant for father with diabetes.     Blood pressure is well controlled at this time. Denies any falls in the last year. No depression over the last few weeks.  She is a caregiver for her .     MRI showed spinal stenosis of lumbar spine. She is scheduled to do physical therapy for strengthening her back and she has had injections for this. She states she gained 6 lbs being on gabapentin and stopped taking them in the morning.     Cholesterol was elevated on last lab. She mentions she had trouble tolerating statins.     She declines Covid Booster and flu vaccines. She is updated with tetanus, Prevnar, Shingrix vaccines. Recommended to take RSV from the pharmacy. Mammogram up-to-date and DEXA showed osteopenia. Encouraged to take vitamin D supplements, calcium and strength training exercises.      Review of Systems  Constitutional: No fever or chills  Cardiovascular: no chest pain, no palpitations and no syncope.   Respiratory: no cough, no shortness of breath during exertion and no shortness of breath at rest.   Gastrointestinal: no abdominal pain, no nausea and no vomiting.  Neuro: No Headache, no dizziness    Objective   /73   Pulse 69   Ht 1.575 m (5' 2\")   Wt 67.1 kg (148 lb)   SpO2 94%   BMI 27.07 kg/m²     Physical Exam  Constitutional: Alert and in no acute distress. Well developed, well " nourished  Head and Face: Head and face: Normal.    Cardiovascular: Heart rate and rhythm were normal, normal S1 and S2. No peripheral edema.   Pulmonary: No respiratory distress. Clear bilateral breath sounds.  Musculoskeletal: Gait and station: Normal. Muscle strength/tone: Normal.   Skin: Normal skin color and pigmentation, normal skin turgor, and no rash.    Psychiatric: Judgment and insight: Intact. Mood and affect: Normal.      Lab Results   Component Value Date    WBC 8.5 06/21/2023    HGB 13.6 06/21/2023    HCT 39.6 06/21/2023     06/21/2023    CHOL 218 (H) 11/21/2017    TRIG 84 11/21/2017    HDL 65.2 11/21/2017    ALT 16 06/09/2025    AST 17 06/09/2025     06/09/2025    K 4.8 06/09/2025     06/09/2025    CREATININE 1.02 (H) 06/09/2025    BUN 24 06/09/2025    CO2 29 06/09/2025    TSH 2.02 11/22/2024    INR 1.0 08/08/2019    HGBA1C 5.6 03/21/2025       Transthoracic echo (TTE) [facility], [address], [place] [address], Tel                                   [phone]    TRANSTHORACIC ECHOCARDIOGRAM REPORT       Patient Name:       JIM MENESES JESSICA  Reading Physician:    [id number] Papi Mahajan MD  Study Date:         6/11/2025           Ordering Provider:    [id number] JULIANO DIXON  MRN/PID:            [id number]            Fellow:  Accession#:         [id number]        Nurse:  Date of Birth/Age:  1940 / 85      Sonographer:          Gema Nava RDCS                      years  Gender assigned at  F                   Additional Staff:  Birth:  Height:             157.48 cm           Admit Date:  Weight:             68.04 kg            Admission Status:     Outpatient  BSA / BMI:          1.69 m2 / 27.44     Encounter#:           [id number]                      kg/m2  Blood Pressure:     120/64 mmHg         Department Location:   [facility]    Study Type:    TRANSTHORACIC ECHO (TTE) COMPLETE  Diagnosis/ICD: Chest pain, unspecified-R07.9  Indication:    Chest Pain  CPT Code:      Echo Complete w Full Doppler-55026    Patient History:  Pertinent History: HTN, Hyperlipidemia, COPD and Cancer. lung cancer, CKDIII.    Study Detail: The following Echo studies were performed: 2D, M-Mode, Doppler and                color flow.       PHYSICIAN INTERPRETATION:  Left Ventricle: Left ventricular ejection fraction is normal by visual estimate at 70%. There are no regional left ventricular wall motion abnormalities. The left ventricular cavity size is normal. There is mildly increased septal and mildly increased posterior left ventricular wall thickness. There is left ventricular concentric remodeling. Spectral Doppler shows a normal pattern of left ventricular diastolic filling.  Left Atrium: The left atrial size is normal.  Right Ventricle: The right ventricle is normal in size. There is normal right ventricular global systolic function.  Right Atrium: The right atrium is normal in size.  Aortic Valve: The aortic valve is trileaflet. The aortic valve area by VTI is 2.15 cmï¿½ with a peak velocity of 1.27 m/s. The peak and mean gradients are 6 mmHg and 4 mmHg, respectively with a dimensionless index of 0.66. There is no evidence of aortic valve regurgitation.  Mitral Valve: The mitral valve is normal in structure. There is mild mitral valve regurgitation. The E Vmax is 0.75 m/s.  Tricuspid Valve: The tricuspid valve is structurally normal. There is trace tricuspid regurgitation. The right ventricular systolic pressure could not be estimated.  Pulmonic Valve: The pulmonic valve is structurally normal. There is mild pulmonic valve regurgitation.  Pericardium: Small pericardial effusion.  Aorta: The aortic root is normal.  Systemic Veins: The inferior vena cava appears small in size, with IVC inspiratory collapse greater than 50%.  In comparison to  the previous echocardiogram(s): There are no prior studies on this patient for comparison purposes.       CONCLUSIONS:   1. Left ventricular ejection fraction is normal by visual estimate at 70%.   2. There is normal right ventricular global systolic function.   3. Small pericardial effusion.    QUANTITATIVE DATA SUMMARY:     2D MEASUREMENTS:          Normal Ranges:  LAs:             3.30 cm  (2.7-4.0cm)  IVSd:            0.97 cm  (0.6-1.1cm)  LVPWd:           0.95 cm  (0.6-1.1cm)  LVIDd:           4.17 cm  (3.9-5.9cm)  LVIDs:           2.70 cm  LV Mass Index:   78 g/m2  LVEDV Index:     32 ml/m2  LV % FS          35.2 %       LEFT ATRIUM:                  Normal Ranges:  LA Vol A4C:        50.8 ml    (22+/-6mL/m2)  LA Vol A2C:        47.1 ml  LA Vol BP:         49.4 ml  LA Vol Index A4C:  30.0ml/m2  LA Vol Index A2C:  27.9 ml/m2  LA Vol Index BP:   29.2 ml/m2  LA Area A4C:       17.8 cm2  LA Area A2C:       17.3 cm2  LA Major Axis A4C: 5.3 cm  LA Major Axis A2C: 5.4 cm  LA Volume Index:   29.1 ml/m2       RIGHT ATRIUM:                 Normal Ranges:  RA Vol A4C:        33.9 ml    (8.3-19.5ml)  RA Vol Index A4C:  20.1 ml/m2  RA Area A4C:       13.7 cm2  RA Major Axis A4C: 4.7 cm       AORTA MEASUREMENTS:         Normal Ranges:  Ao Sinus, d:        3.00 cm (2.1-3.5cm)  Ao STJ, d:          2.20 cm (1.7-3.4cm)  Asc Ao, d:          2.60 cm (2.1-3.4cm)       LV SYSTOLIC FUNCTION:                       Normal Ranges:  EF-A4C View:    70 % (>=55%)  EF-A2C View:    63 %  EF-Biplane:     66 %  EF-Visual:      70 %  LV EF Reported: 70 %       LV DIASTOLIC FUNCTION:             Normal Ranges:  MV Peak E:             0.75 m/s    (0.7-1.2 m/s)  MV Peak A:             0.94 m/s    (0.42-0.7 m/s)  E/A Ratio:             0.80        (1.0-2.2)  MV e'                  0.065 m/s   (>8.0)  MV lateral e'          0.06 m/s  MV medial e'           0.07 m/s  MV A Dur:              125.59 msec  E/e' Ratio:            11.57       (<8.0)  a'                      0.10 m/s  PulmV Sys Blayne:         45.48 cm/s  PulmV Wills Blayne:        38.99 cm/s  PulmV S/D Blayne:         1.17  PulmV A Revs Blayne:      21.11 cm/s  PulmV A Revs Dur:      85.63 msec       MITRAL VALVE:          Normal Ranges:  MV DT:        222 msec (150-240msec)       AORTIC VALVE:                     Normal Ranges:  AoV Vmax:                1.27 m/s (<=1.7m/s)  AoV Peak P.4 mmHg (<20mmHg)  AoV Mean PG:             3.7 mmHg (1.7-11.5mmHg)  LVOT Max Blayne:            0.79 m/s (<=1.1m/s)  AoV VTI:                 28.97 cm (18-25cm)  LVOT VTI:                19.16 cm  LVOT Diameter:           2.03 cm  (1.8-2.4cm)  AoV Area, VTI:           2.15 cm2 (2.5-5.5cm2)  AoV Area,Vmax:           2.02 cm2 (2.5-4.5cm2)  AoV Dimensionless Index: 0.66       RIGHT VENTRICLE:  RV Basal 3.30 cm  RV Mid   2.40 cm  RV Major 7.1 cm  TAPSE:   17.0 mm  RV s'    0.13 m/s       TRICUSPID VALVE/RVSP:         Normal Ranges:  Est. RA Pressure:     3  IVC Diam:             0.92 cm       PULMONIC VALVE:          Normal Ranges:  PV Accel Time:  101 msec (>120ms)  PV Max Blayne:     0.6 m/s  (0.6-0.9m/s)  PV Max P.7 mmHg       PULMONARY VEINS:  PulmV A Revs Dur: 85.63 msec  PulmV A Revs Blayne: 21.11 cm/s  PulmV Wills Blayne:   38.99 cm/s  PulmV S/D Blayne:    1.17  PulmV Sys Blayne:    45.48 cm/s       AORTA:  Asc Ao Diam 2.63 cm       [id number] Papi Mahajan MD  Electronically signed on 2025 at 9:07:26 PM       ** Final **      Assessment/Plan   Assessment & Plan  Mixed hyperlipidemia  She is on Zetia. Will check lipid panel.   Orders:    Lipid Panel; Future    Primary hypertension  Well-controlled.  She is on amlodipine. Take hydralazine if BP is elevated.    Orders:    hydrALAZINE (Apresoline) 10 mg tablet; Take 1 tablet (10 mg) by mouth 3 times a day as needed (if Systolic BP > 160).    Follow Up In [facility] - PCP - Established; Future    Follow Up In [facility] - PCP - Medicare Annual; Future     CBC; Future    Albumin-Creatinine Ratio, Urine Random; Future    Comprehensive Metabolic Panel; Future    Hypothyroidism due to Hashimoto thyroiditis  She is on levothyroxine.   Orders:    TSH with reflex to Free T4 if abnormal; Future    Gastroesophageal reflux disease without esophagitis  She takes Nexium.        Stage 3a chronic kidney disease (Multi)  Stable. Continue to monitor. Drink plenty of fluids and avoid ibuprofen, Motrin, Aleve         Lumbar radiculopathy  She takes gabapentin.        History of primary non-small cell carcinoma of left lung  She has a history of lung cancer s/p right upper lobectomy,  COPD with asthma, follows with Dr. Carey, Pulmonary.       Elevated blood sugar  Ordered blood work.  Orders:    Hemoglobin A1C; Future    Vitamin D deficiency  Ordered blood work.  Orders:    Vitamin D 25-Hydroxy,Total (for eval of Vitamin D levels); Future    Vitamin B12 deficiency  Ordered blood work.  Orders:    Vitamin B12; Future              Follow up in November 2025. Please get blood work done prior to your appointment.     Your yearly Physical is due in: May 2026  When you call the office for your yearly Physical, please ask them to inform me to order your blood work, so that you can get the fasting blood work before your appointment and we can discuss the results at your physical.      Please call me if any questions arise from now until your next visit. I will call you after I am done seeing patients. A Doctor is always available by phone when the office is closed. Please feel free to call for help with any problem that you feel shouldn't wait until the office re-opens.     I, Fabiola Jordan MD, attest that this note for 7/22/2025 accurately reflects documentation that my scribe Ailyn Siddiqui made at my direction in my capacity as Fabiola Jordan MD when I treated Rochelle Hamlin.    Scribe Attestation  By signing my name below, IAilyn Scribe   attest that this documentation  has been prepared under the direction and in the presence of Fabiola Jordan MD.

## 2025-07-22 NOTE — ASSESSMENT & PLAN NOTE
She has a history of lung cancer s/p right upper lobectomy,  COPD with asthma, follows with Dr. Carey, Pulmonary.

## 2025-07-22 NOTE — PATIENT INSTRUCTIONS
Immunizations you need to get from the pharmacy: RSV    If you need labs done, please go to any  lab, no paperwork needed. If a Lipid panel is ordered, you will need to fast overnight, other blood work does not require fasting.   Lab in this building is in [address] (M-F 7:30-5:00pm and Sat 8-12pm)    Please call me if any questions arise from now until your next visit. I will call you after I am done seeing patients. A Doctor is always available by phone when the office is closed. Please feel free to call for help with any problem that you feel shouldn't wait until the office re-opens.

## 2025-07-24 ENCOUNTER — TREATMENT (OUTPATIENT)
Dept: PHYSICAL THERAPY | Facility: CLINIC | Age: 85
End: 2025-07-24
Payer: MEDICARE

## 2025-07-24 DIAGNOSIS — M54.16 LUMBAR RADICULOPATHY: ICD-10-CM

## 2025-07-24 DIAGNOSIS — M54.2 NECK PAIN: ICD-10-CM

## 2025-07-24 DIAGNOSIS — R53.1 WEAKNESS: ICD-10-CM

## 2025-07-24 DIAGNOSIS — M54.41 BILATERAL LOW BACK PAIN WITH RIGHT-SIDED SCIATICA: ICD-10-CM

## 2025-07-24 PROCEDURE — 97110 THERAPEUTIC EXERCISES: CPT | Mod: GP | Performed by: PHYSICAL THERAPIST

## 2025-07-24 NOTE — PROGRESS NOTES
"Physical Therapy  Physical Therapy Treatment    Patient Name: Rochelle Hamlin  MRN: [id number]  Today's Date: 7/24/2025  Time Calculation  Start Time: 0830  Stop Time: 0906  Time Calculation (min): 36 min    Visit # 2 of 8 7/17/25-9/11/25  Reason for visit: lumbar radiculopathy, neck pain, musculoskeletal pain   Referring MD: Boris Sarmiento  Insurance: MED NEC / NO AUTH / ESTIMATE / [employer]HC MEDICARE / NO DED NO    DX: mary lBP right sciatica, neck pain , weakness   Frequency: 1 /week   Duration: 6-8 weeks Focus on low back and incorporate neck and posture PRN.    Certification Period Start Date: 07/17/25  Certification Period End Date: 10/15/25    Current Problem  1. Lumbar radiculopathy        2. Bilateral low back pain with right-sided sciatica  Follow Up In Physical Therapy      3. Neck pain  Follow Up In Physical Therapy      4. Weakness  Follow Up In Physical Therapy          General  Reason for Referral: lumbar radiculopathy, neck pain, musculoskeletal pain  Referred By: Boris Sarmiento  General Comment: not a fall risk  Precautions  Precautions  STEADI Fall Risk Score (The score of 4 or more indicates an increased risk of falling): 1  Pain       Subjective:   Subjective   Patient reports 4/10 current pain.  Reports compliance with HEP but stopped doing because it irritated her sciatica.  JACK bothered sciatica, and hamstring stretch was hard to do.      Objective:   Objective     More left than right low back 4/10   REP SKTC 5\" x10 - NBNW 4/10   DKTC inc pain , worse 6/10  Seated rep flexion - 3/10 moved over to right side   -2nd set  better 2/10     Treatments:    JACK - stopped and removed from HEP as it irritated sciatic symptoms  Seated rep flexion 2x10   Piri stetch w/KTC x1' mary   Bridge 2x10   Clamshell x20 mary   March 2x20   Bill stretch x1' mary   Seated hamstring stretch x1' mary          Access Code: [email or web address]    Charges: 2 TE     - PT Therapeutic Procedures Time Entry  Therapeutic Exercise Time Entry: " 36 -         Assessment:    Patient reported more laying on hard table irritating her low back than the flexion exercises performed.   Overall less pain after session.      Plan:     Continue per POC as tolerated to dec pain/radicular symptoms, increase ROM, flexibility and strength to allow improved function in normal ADL's.        Tay Parada, PT

## 2025-07-27 PROBLEM — R07.9 CHEST PAIN: Status: ACTIVE | Noted: 2025-07-27

## 2025-07-27 ASSESSMENT — ENCOUNTER SYMPTOMS
PALPITATIONS: 0
DIZZINESS: 0
CHILLS: 0
LIGHT-HEADEDNESS: 0
BLOOD IN STOOL: 0
FATIGUE: 0
SHORTNESS OF BREATH: 0
HEMATURIA: 0
FEVER: 0
MYALGIAS: 0
BACK PAIN: 1

## 2025-07-31 ENCOUNTER — TREATMENT (OUTPATIENT)
Dept: PHYSICAL THERAPY | Facility: CLINIC | Age: 85
End: 2025-07-31
Payer: MEDICARE

## 2025-07-31 DIAGNOSIS — M54.2 NECK PAIN: ICD-10-CM

## 2025-07-31 DIAGNOSIS — R53.1 WEAKNESS: ICD-10-CM

## 2025-07-31 DIAGNOSIS — M54.41 BILATERAL LOW BACK PAIN WITH RIGHT-SIDED SCIATICA: ICD-10-CM

## 2025-07-31 DIAGNOSIS — M54.16 LUMBAR RADICULOPATHY: ICD-10-CM

## 2025-07-31 PROCEDURE — 97110 THERAPEUTIC EXERCISES: CPT | Mod: GP | Performed by: PHYSICAL THERAPIST

## 2025-07-31 NOTE — PROGRESS NOTES
"Physical Therapy  Physical Therapy Treatment    Patient Name: Rochelle Hamlin  MRN: [id number]  Today's Date: 7/31/2025  Time Calculation  Start Time: 0830  Stop Time: 0903  Time Calculation (min): 33 min    Visit # 3 of 8 7/17/25-9/11/25  Reason for visit: lumbar radiculopathy, neck pain, musculoskeletal pain   Referring MD: Boris Sarmiento  Insurance: MED NEC / NO AUTH / ESTIMATE / UHC MEDICARE / NO DED NO    DX: mary lBP right sciatica, neck pain , weakness   Frequency: 1 /week   Duration: 6-8 weeks Focus on low back and incorporate neck and posture PRN.    Certification Period Start Date: 07/17/25  Certification Period End Date: 10/15/25    Current Problem  1. Lumbar radiculopathy        2. Bilateral low back pain with right-sided sciatica  Follow Up In Physical Therapy      3. Neck pain  Follow Up In Physical Therapy      4. Weakness  Follow Up In Physical Therapy          General  Reason for Referral: lumbar radiculopathy, neck pain, musculoskeletal pain  Referred By: Boris Sarmiento  General Comment: not a fall risk  Precautions  Precautions  STEADI Fall Risk Score (The score of 4 or more indicates an increased risk of falling): 1  Pain       Subjective:   Subjective   Patient reports she was doing really good until this Am.  I golfed yesterday and felt OK.  I did my exercises 2 times and the second time it hurt.  Pain now 5/10 in my waist. No pain in legs currently. The best exercise I do is where I bend over and reach for the ground.    HEP compliance- yes     Objective:   Objective     More left than right low back 4/10   REP SKTC 5\" x10 - NBNW 4/10   DKTC inc pain , worse 6/10  Seated rep flexion - 3/10 moved over to right side   -2nd set  better 2/10     Treatments:  Stepper L3 x5'   JACK - stopped and removed from HEP as it irritated sciatic symptoms  Seated rep flexion 2x10   Piri stetch  x1' mary   Bridge RTB abd  2x10   Clamshell x20 RTB  mary   March 2x20 vs RTB   Bill stretch w/KTC x1' mary "   Seated hamstring stretch x1' mary   DBE 2'x2way   AIREX MIP x1'   Tandem KB swing x10 cw/ccw 4 kg          Access Code: [email or web address]    Charges: 2 TE     - PT Therapeutic Procedures Time Entry  Therapeutic Exercise Time Entry: 33 -         Assessment:    Patient reported less pain after flexion stretches.  Instructed to increase frequency at home to reduce pain.  Min verbal and tactile cues to correct exercise.  Reported no pain after mat table stretches and strengthening exercises.  Did report some right lower leg pain on AIREX MIP exercise, able to reduce with seated lumbar flexion.  .      Plan:     Continue per POC as tolerated to dec pain/radicular symptoms, increase ROM, flexibility and strength to allow improved function in normal ADL's.        Tay Parada, PT

## 2025-08-07 ENCOUNTER — APPOINTMENT (OUTPATIENT)
Dept: PHYSICAL THERAPY | Facility: CLINIC | Age: 85
End: 2025-08-07
Payer: MEDICARE

## 2025-08-19 ENCOUNTER — APPOINTMENT (OUTPATIENT)
Dept: PHYSICAL THERAPY | Facility: CLINIC | Age: 85
End: 2025-08-19
Payer: MEDICARE

## 2025-08-25 DIAGNOSIS — E05.90 HYPERTHYROIDISM: ICD-10-CM

## 2025-08-25 RX ORDER — LEVOTHYROXINE SODIUM 50 UG/1
50 TABLET ORAL
Qty: 90 TABLET | Refills: 1 | Status: SHIPPED | OUTPATIENT
Start: 2025-08-25

## 2025-08-27 ENCOUNTER — OFFICE VISIT (OUTPATIENT)
Dept: PRIMARY CARE | Facility: CLINIC | Age: 85
End: 2025-08-27
Payer: MEDICARE

## 2025-08-27 VITALS
DIASTOLIC BLOOD PRESSURE: 77 MMHG | HEART RATE: 79 BPM | WEIGHT: 144 LBS | HEIGHT: 62 IN | BODY MASS INDEX: 26.5 KG/M2 | SYSTOLIC BLOOD PRESSURE: 126 MMHG | OXYGEN SATURATION: 94 %

## 2025-08-27 DIAGNOSIS — K21.9 GASTROESOPHAGEAL REFLUX DISEASE WITHOUT ESOPHAGITIS: Primary | ICD-10-CM

## 2025-08-27 DIAGNOSIS — R07.9 CHEST PAIN, UNSPECIFIED TYPE: ICD-10-CM

## 2025-08-27 DIAGNOSIS — E06.3 HYPOTHYROIDISM DUE TO HASHIMOTO THYROIDITIS: ICD-10-CM

## 2025-08-27 PROCEDURE — 99214 OFFICE O/P EST MOD 30 MIN: CPT | Performed by: FAMILY MEDICINE

## 2025-08-27 PROCEDURE — 1159F MED LIST DOCD IN RCRD: CPT | Performed by: FAMILY MEDICINE

## 2025-08-27 PROCEDURE — 3078F DIAST BP <80 MM HG: CPT | Performed by: FAMILY MEDICINE

## 2025-08-27 PROCEDURE — 1036F TOBACCO NON-USER: CPT | Performed by: FAMILY MEDICINE

## 2025-08-27 PROCEDURE — 3074F SYST BP LT 130 MM HG: CPT | Performed by: FAMILY MEDICINE

## 2025-08-27 PROCEDURE — G2211 COMPLEX E/M VISIT ADD ON: HCPCS | Performed by: FAMILY MEDICINE

## 2025-08-27 PROCEDURE — 1160F RVW MEDS BY RX/DR IN RCRD: CPT | Performed by: FAMILY MEDICINE

## 2025-08-27 RX ORDER — ESOMEPRAZOLE MAGNESIUM 40 MG/1
40 CAPSULE, DELAYED RELEASE ORAL 2 TIMES DAILY
Qty: 180 CAPSULE | Refills: 0 | Status: SHIPPED | OUTPATIENT
Start: 2025-08-27

## 2025-08-28 ENCOUNTER — APPOINTMENT (OUTPATIENT)
Dept: ENDOCRINOLOGY | Facility: CLINIC | Age: 85
End: 2025-08-28
Payer: MEDICARE

## 2025-08-28 ENCOUNTER — TREATMENT (OUTPATIENT)
Dept: PHYSICAL THERAPY | Facility: CLINIC | Age: 85
End: 2025-08-28
Payer: MEDICARE

## 2025-08-28 VITALS — SYSTOLIC BLOOD PRESSURE: 114 MMHG | BODY MASS INDEX: 26.7 KG/M2 | WEIGHT: 146 LBS | DIASTOLIC BLOOD PRESSURE: 68 MMHG

## 2025-08-28 DIAGNOSIS — E03.9 HYPOTHYROIDISM, UNSPECIFIED TYPE: ICD-10-CM

## 2025-08-28 DIAGNOSIS — M54.41 BILATERAL LOW BACK PAIN WITH RIGHT-SIDED SCIATICA: ICD-10-CM

## 2025-08-28 DIAGNOSIS — M54.2 NECK PAIN: ICD-10-CM

## 2025-08-28 DIAGNOSIS — R53.1 WEAKNESS: ICD-10-CM

## 2025-08-28 PROCEDURE — 97110 THERAPEUTIC EXERCISES: CPT | Mod: GP | Performed by: PHYSICAL THERAPIST

## 2025-08-28 PROCEDURE — 99214 OFFICE O/P EST MOD 30 MIN: CPT | Performed by: INTERNAL MEDICINE

## 2025-08-28 PROCEDURE — 3078F DIAST BP <80 MM HG: CPT | Performed by: INTERNAL MEDICINE

## 2025-08-28 PROCEDURE — 3074F SYST BP LT 130 MM HG: CPT | Performed by: INTERNAL MEDICINE

## 2025-09-02 ENCOUNTER — TREATMENT (OUTPATIENT)
Dept: PHYSICAL THERAPY | Facility: CLINIC | Age: 85
End: 2025-09-02
Payer: MEDICARE

## 2025-09-02 DIAGNOSIS — M54.2 NECK PAIN: ICD-10-CM

## 2025-09-02 DIAGNOSIS — M54.41 BILATERAL LOW BACK PAIN WITH RIGHT-SIDED SCIATICA: Primary | ICD-10-CM

## 2025-09-02 DIAGNOSIS — R53.1 WEAKNESS: ICD-10-CM

## 2025-09-02 PROCEDURE — 97110 THERAPEUTIC EXERCISES: CPT | Mod: GP | Performed by: PHYSICAL THERAPIST

## 2025-09-05 PROCEDURE — 82565 ASSAY OF CREATININE: CPT

## 2025-09-05 PROCEDURE — 36415 COLL VENOUS BLD VENIPUNCTURE: CPT

## 2025-09-06 ENCOUNTER — LAB (OUTPATIENT)
Dept: LAB | Facility: HOSPITAL | Age: 85
End: 2025-09-06
Payer: MEDICARE

## 2025-09-06 DIAGNOSIS — E78.2 MIXED HYPERLIPIDEMIA: Primary | ICD-10-CM

## 2025-09-24 ENCOUNTER — APPOINTMENT (OUTPATIENT)
Dept: RADIOLOGY | Facility: HOSPITAL | Age: 85
End: 2025-09-24
Payer: MEDICARE

## 2025-11-20 ENCOUNTER — APPOINTMENT (OUTPATIENT)
Dept: PRIMARY CARE | Facility: CLINIC | Age: 85
End: 2025-11-20
Payer: MEDICARE

## 2026-05-26 ENCOUNTER — APPOINTMENT (OUTPATIENT)
Dept: PRIMARY CARE | Facility: CLINIC | Age: 86
End: 2026-05-26
Payer: MEDICARE

## 2026-08-25 ENCOUNTER — APPOINTMENT (OUTPATIENT)
Dept: ENDOCRINOLOGY | Facility: CLINIC | Age: 86
End: 2026-08-25
Payer: MEDICARE